# Patient Record
Sex: MALE | Race: WHITE | NOT HISPANIC OR LATINO
[De-identification: names, ages, dates, MRNs, and addresses within clinical notes are randomized per-mention and may not be internally consistent; named-entity substitution may affect disease eponyms.]

---

## 2017-01-09 ENCOUNTER — MEDICATION RENEWAL (OUTPATIENT)
Age: 77
End: 2017-01-09

## 2017-03-23 ENCOUNTER — APPOINTMENT (OUTPATIENT)
Dept: UROLOGY | Facility: CLINIC | Age: 77
End: 2017-03-23
Payer: MEDICARE

## 2017-03-23 PROCEDURE — 99213 OFFICE O/P EST LOW 20 MIN: CPT

## 2017-04-04 ENCOUNTER — NON-APPOINTMENT (OUTPATIENT)
Age: 77
End: 2017-04-04

## 2017-04-04 ENCOUNTER — APPOINTMENT (OUTPATIENT)
Dept: ELECTROPHYSIOLOGY | Facility: CLINIC | Age: 77
End: 2017-04-04

## 2017-04-04 VITALS
BODY MASS INDEX: 22.66 KG/M2 | HEART RATE: 63 BPM | WEIGHT: 153 LBS | HEIGHT: 69 IN | SYSTOLIC BLOOD PRESSURE: 147 MMHG | OXYGEN SATURATION: 98 % | DIASTOLIC BLOOD PRESSURE: 75 MMHG

## 2017-04-04 VITALS — DIASTOLIC BLOOD PRESSURE: 65 MMHG | SYSTOLIC BLOOD PRESSURE: 125 MMHG

## 2017-04-04 DIAGNOSIS — R00.2 PALPITATIONS: ICD-10-CM

## 2017-10-12 ENCOUNTER — APPOINTMENT (OUTPATIENT)
Dept: UROLOGY | Facility: CLINIC | Age: 77
End: 2017-10-12
Payer: MEDICARE

## 2017-10-12 VITALS
TEMPERATURE: 98.2 F | DIASTOLIC BLOOD PRESSURE: 74 MMHG | HEIGHT: 69 IN | SYSTOLIC BLOOD PRESSURE: 128 MMHG | HEART RATE: 74 BPM | RESPIRATION RATE: 16 BRPM | BODY MASS INDEX: 22.66 KG/M2 | WEIGHT: 153 LBS | OXYGEN SATURATION: 98 %

## 2017-10-12 PROCEDURE — 99213 OFFICE O/P EST LOW 20 MIN: CPT

## 2017-11-02 ENCOUNTER — APPOINTMENT (OUTPATIENT)
Dept: ELECTROPHYSIOLOGY | Facility: CLINIC | Age: 77
End: 2017-11-02
Payer: MEDICARE

## 2017-11-02 ENCOUNTER — NON-APPOINTMENT (OUTPATIENT)
Age: 77
End: 2017-11-02

## 2017-11-02 ENCOUNTER — MEDICATION RENEWAL (OUTPATIENT)
Age: 77
End: 2017-11-02

## 2017-11-02 VITALS — DIASTOLIC BLOOD PRESSURE: 66 MMHG | OXYGEN SATURATION: 96 % | SYSTOLIC BLOOD PRESSURE: 110 MMHG

## 2017-11-02 PROCEDURE — 99214 OFFICE O/P EST MOD 30 MIN: CPT

## 2017-11-02 PROCEDURE — 93000 ELECTROCARDIOGRAM COMPLETE: CPT

## 2018-05-03 ENCOUNTER — APPOINTMENT (OUTPATIENT)
Dept: ELECTROPHYSIOLOGY | Facility: CLINIC | Age: 78
End: 2018-05-03
Payer: MEDICARE

## 2018-05-03 ENCOUNTER — NON-APPOINTMENT (OUTPATIENT)
Age: 78
End: 2018-05-03

## 2018-05-03 VITALS
BODY MASS INDEX: 23.48 KG/M2 | WEIGHT: 159 LBS | OXYGEN SATURATION: 98 % | SYSTOLIC BLOOD PRESSURE: 159 MMHG | DIASTOLIC BLOOD PRESSURE: 75 MMHG | HEART RATE: 58 BPM

## 2018-05-03 VITALS — SYSTOLIC BLOOD PRESSURE: 120 MMHG | DIASTOLIC BLOOD PRESSURE: 60 MMHG

## 2018-05-03 PROCEDURE — 93000 ELECTROCARDIOGRAM COMPLETE: CPT

## 2018-05-03 PROCEDURE — 99215 OFFICE O/P EST HI 40 MIN: CPT

## 2018-05-03 RX ORDER — LEVOCETIRIZINE DIHYDROCHLORIDE 5 MG/1
5 TABLET ORAL DAILY
Refills: 0 | Status: DISCONTINUED | COMMUNITY
End: 2018-05-03

## 2018-06-14 ENCOUNTER — APPOINTMENT (OUTPATIENT)
Dept: UROLOGY | Facility: CLINIC | Age: 78
End: 2018-06-14
Payer: MEDICARE

## 2018-06-14 VITALS — OXYGEN SATURATION: 96 % | SYSTOLIC BLOOD PRESSURE: 110 MMHG | HEART RATE: 66 BPM | DIASTOLIC BLOOD PRESSURE: 60 MMHG

## 2018-06-14 PROCEDURE — 99213 OFFICE O/P EST LOW 20 MIN: CPT

## 2018-07-05 ENCOUNTER — APPOINTMENT (OUTPATIENT)
Dept: NEUROLOGY | Facility: CLINIC | Age: 78
End: 2018-07-05
Payer: MEDICARE

## 2018-07-05 VITALS
OXYGEN SATURATION: 96 % | HEIGHT: 69 IN | SYSTOLIC BLOOD PRESSURE: 120 MMHG | BODY MASS INDEX: 23.25 KG/M2 | TEMPERATURE: 98.6 F | RESPIRATION RATE: 16 BRPM | WEIGHT: 157 LBS | HEART RATE: 60 BPM | DIASTOLIC BLOOD PRESSURE: 70 MMHG

## 2018-07-05 PROCEDURE — 99204 OFFICE O/P NEW MOD 45 MIN: CPT

## 2018-10-23 ENCOUNTER — NON-APPOINTMENT (OUTPATIENT)
Age: 78
End: 2018-10-23

## 2018-10-23 ENCOUNTER — APPOINTMENT (OUTPATIENT)
Dept: ELECTROPHYSIOLOGY | Facility: CLINIC | Age: 78
End: 2018-10-23
Payer: MEDICARE

## 2018-10-23 VITALS — SYSTOLIC BLOOD PRESSURE: 115 MMHG | DIASTOLIC BLOOD PRESSURE: 65 MMHG

## 2018-10-23 VITALS
WEIGHT: 157 LBS | BODY MASS INDEX: 23.18 KG/M2 | OXYGEN SATURATION: 98 % | SYSTOLIC BLOOD PRESSURE: 154 MMHG | HEART RATE: 60 BPM | DIASTOLIC BLOOD PRESSURE: 81 MMHG

## 2018-10-23 PROCEDURE — 93000 ELECTROCARDIOGRAM COMPLETE: CPT

## 2018-10-23 PROCEDURE — 99213 OFFICE O/P EST LOW 20 MIN: CPT

## 2018-10-30 ENCOUNTER — MEDICATION RENEWAL (OUTPATIENT)
Age: 78
End: 2018-10-30

## 2019-01-08 ENCOUNTER — APPOINTMENT (OUTPATIENT)
Dept: NEUROLOGY | Facility: CLINIC | Age: 79
End: 2019-01-08
Payer: MEDICARE

## 2019-01-08 VITALS
BODY MASS INDEX: 22.66 KG/M2 | SYSTOLIC BLOOD PRESSURE: 124 MMHG | HEART RATE: 59 BPM | HEIGHT: 69 IN | DIASTOLIC BLOOD PRESSURE: 68 MMHG | WEIGHT: 153 LBS

## 2019-01-08 PROCEDURE — 99213 OFFICE O/P EST LOW 20 MIN: CPT

## 2019-03-07 ENCOUNTER — APPOINTMENT (OUTPATIENT)
Dept: UROLOGY | Facility: CLINIC | Age: 79
End: 2019-03-07
Payer: MEDICARE

## 2019-03-07 VITALS
DIASTOLIC BLOOD PRESSURE: 60 MMHG | HEART RATE: 84 BPM | HEIGHT: 69 IN | SYSTOLIC BLOOD PRESSURE: 110 MMHG | WEIGHT: 149 LBS | BODY MASS INDEX: 22.07 KG/M2 | OXYGEN SATURATION: 98 %

## 2019-03-07 PROCEDURE — 99213 OFFICE O/P EST LOW 20 MIN: CPT

## 2019-03-07 RX ORDER — UMECLIDINIUM BROMIDE AND VILANTEROL TRIFENATATE 62.5; 25 UG/1; UG/1
62.5-25 POWDER RESPIRATORY (INHALATION)
Refills: 0 | Status: ACTIVE | COMMUNITY

## 2019-03-07 NOTE — HISTORY OF PRESENT ILLNESS
[FreeTextEntry1] : He is a 78-year-old man who seen today in follow-up for elevated PSA and complex renal cyst. Since last visit, he has developed Parkinson's disease. Urinary flow is slow. Also flow is interrupted. Nocturia is 2-3 times. Residual urine again today was minimal. He is on tamsulosin 0.8 mg and finasteride. In June 2018, PSA level was 2.03. He has no flank pain. He has chronic renal insufficiency with creatinine level around 2.5. CT scan of abdomen and pelvis without contrast in April 2018 showed a 10 cm left renal cyst unchanged from before.\par Previous notes: Renal ultrasound in January 2016 showed complex cyst in the left kidney 7.9 cm and other bilateral renal cysts including 2 complex cysts in the left kidney up to 1.8 cm. Non-contrast CT scan in May 2016 showed punctate bilateral renal stones, renal cysts of varying sizes largest on the left side.\par He underwent cystoscopy in 11/2013 which showed a large prostate and bleeding from the surface of the prostate. He had 2 prostate biopsies in the past. Previous CT scans including a CT scan with contrast in 2010 indicated that he had a large parapelvic renal cyst.

## 2019-03-07 NOTE — ASSESSMENT
[FreeTextEntry1] : Urinary symptoms are not changed significantly. He is maximized on Flomax and Proscar. His urinary symptoms worsen, we could consider surgical treatment options. He will think about it. He can followup in 6-9 months.\par \par Kwame Brady MD, FACS\par [facility]\par  of Urology\par \par [address], [address]\[place] [address]\par \par [address], [address]\[place] [address]\par \par Tel: [phone]\par Fax: [phone]

## 2019-03-07 NOTE — LETTER BODY
[Dear  ___] : Dear  [unfilled], [Attached please find my note.] : Attached please find my note. [Thank you very much for allowing me to participate in the care of this patient. If you have any questions, please do not hesitate to contact me] : Thank you very much for allowing me to participate in the care of this patient. If you have any questions, please do not hesitate to contact me. [FreeTextEntry1] : ACP\par [address] \[place], [place], [address]  \par [phone] \par

## 2019-03-07 NOTE — PHYSICAL EXAM
[General Appearance - Well Developed] : well developed [General Appearance - Well Nourished] : well nourished [Normal Appearance] : normal appearance [Well Groomed] : well groomed [General Appearance - In No Acute Distress] : no acute distress [Abdomen Soft] : soft [Abdomen Tenderness] : non-tender [Costovertebral Angle Tenderness] : no ~M costovertebral angle tenderness [Urethral Meatus] : meatus normal [Penis Abnormality] : normal circumcised penis [Urinary Bladder Findings] : the bladder was normal on palpation [Scrotum] : the scrotum was normal [Epididymis] : the epididymides were normal [Testes Tenderness] : no tenderness of the testes [Testes Mass (___cm)] : there were no testicular masses [Prostate Tenderness] : the prostate was not tender [No Prostate Nodules] : no prostate nodules [Prostate Enlarged] : was enlarged [FreeTextEntry1] : ELVER done 3/2019 [] : no respiratory distress [Respiration, Rhythm And Depth] : normal respiratory rhythm and effort [Exaggerated Use Of Accessory Muscles For Inspiration] : no accessory muscle use [Oriented To Time, Place, And Person] : oriented to person, place, and time [Affect] : the affect was normal [Mood] : the mood was normal [Not Anxious] : not anxious

## 2019-04-18 ENCOUNTER — NON-APPOINTMENT (OUTPATIENT)
Age: 79
End: 2019-04-18

## 2019-04-18 ENCOUNTER — APPOINTMENT (OUTPATIENT)
Dept: ELECTROPHYSIOLOGY | Facility: CLINIC | Age: 79
End: 2019-04-18
Payer: MEDICARE

## 2019-04-18 VITALS
SYSTOLIC BLOOD PRESSURE: 132 MMHG | BODY MASS INDEX: 22.07 KG/M2 | DIASTOLIC BLOOD PRESSURE: 78 MMHG | HEART RATE: 64 BPM | OXYGEN SATURATION: 98 % | HEIGHT: 69 IN | WEIGHT: 149 LBS

## 2019-04-18 PROCEDURE — 93000 ELECTROCARDIOGRAM COMPLETE: CPT

## 2019-04-18 PROCEDURE — 99213 OFFICE O/P EST LOW 20 MIN: CPT

## 2019-04-18 RX ORDER — UMECLIDINIUM BROMIDE AND VILANTEROL TRIFENATATE 62.5; 25 UG/1; UG/1
62.5-25 POWDER RESPIRATORY (INHALATION)
Refills: 0 | Status: DISCONTINUED | COMMUNITY
End: 2019-04-18

## 2019-04-18 NOTE — REASON FOR VISIT
[Follow-Up - Clinic] : a clinic follow-up of [Palpitations] : palpitations [FreeTextEntry1] : Status post caval tricuspid isthmus for typical flutter 8/13/2012.

## 2019-04-18 NOTE — HISTORY OF PRESENT ILLNESS
[FreeTextEntry1] : Rare twinges in the left upper chest.  Very transient.  No associated lightheadedness/dizziness.  No identifiable trigger. He is tolerating his warfarin with no bleeding or TE complication.  He has increased his activity, walking more now that the weather has gotten better.

## 2019-04-18 NOTE — DISCUSSION/SUMMARY
[FreeTextEntry1] : Low burden of symptoms. Stable from arrhythmia standpoint.  c/w metoprolol and AC.  Follow up in 6 months.

## 2019-04-18 NOTE — PHYSICAL EXAM
[General Appearance - Well Developed] : well developed [General Appearance - Well Nourished] : well nourished [Normal Conjunctiva] : the conjunctiva exhibited no abnormalities [Normal Oral Mucosa] : normal oral mucosa [] : no respiratory distress [Normal Oropharynx] : normal oropharynx [Normal Jugular Venous V Waves Present] : normal jugular venous V waves present [Respiration, Rhythm And Depth] : normal respiratory rhythm and effort [Auscultation Breath Sounds / Voice Sounds] : lungs were clear to auscultation bilaterally [Heart Rate And Rhythm] : heart rate and rhythm were normal [Heart Sounds] : normal S1 and S2 [Arterial Pulses Normal] : the arterial pulses were normal [Edema] : no peripheral edema present [Murmurs] : no murmurs present [Bowel Sounds] : normal bowel sounds [Abdomen Soft] : soft [Abnormal Walk] : normal gait [Nail Clubbing] : no clubbing of the fingernails [Skin Color & Pigmentation] : normal skin color and pigmentation [Cyanosis, Localized] : no localized cyanosis [Skin Turgor] : normal skin turgor [Oriented To Time, Place, And Person] : oriented to person, place, and time [Impaired Insight] : insight and judgment were intact

## 2019-04-18 NOTE — REVIEW OF SYSTEMS
[Eyeglasses] : currently wearing eyeglasses [see HPI] : see HPI [Negative] : Heme/Lymph [FreeTextEntry2] : Moes surgery of the left ear (with graft)

## 2019-05-25 ENCOUNTER — INPATIENT (INPATIENT)
Facility: HOSPITAL | Age: 79
LOS: 2 days | Discharge: HOME | DRG: 309 | End: 2019-05-28
Attending: STUDENT IN AN ORGANIZED HEALTH CARE EDUCATION/TRAINING PROGRAM | Admitting: STUDENT IN AN ORGANIZED HEALTH CARE EDUCATION/TRAINING PROGRAM
Payer: COMMERCIAL

## 2019-05-25 VITALS
SYSTOLIC BLOOD PRESSURE: 118 MMHG | OXYGEN SATURATION: 94 % | DIASTOLIC BLOOD PRESSURE: 82 MMHG | RESPIRATION RATE: 16 BRPM | WEIGHT: 151.02 LBS | HEART RATE: 135 BPM | HEIGHT: 69 IN | TEMPERATURE: 98 F

## 2019-05-25 DIAGNOSIS — Z98.89 OTHER SPECIFIED POSTPROCEDURAL STATES: Chronic | ICD-10-CM

## 2019-05-25 DIAGNOSIS — N40.0 BENIGN PROSTATIC HYPERPLASIA WITHOUT LOWER URINARY TRACT SYMPTOMS: ICD-10-CM

## 2019-05-25 DIAGNOSIS — N18.9 CHRONIC KIDNEY DISEASE, UNSPECIFIED: ICD-10-CM

## 2019-05-25 DIAGNOSIS — R00.0 TACHYCARDIA, UNSPECIFIED: ICD-10-CM

## 2019-05-25 DIAGNOSIS — N17.9 ACUTE KIDNEY FAILURE, UNSPECIFIED: ICD-10-CM

## 2019-05-25 DIAGNOSIS — Z29.9 ENCOUNTER FOR PROPHYLACTIC MEASURES, UNSPECIFIED: ICD-10-CM

## 2019-05-25 DIAGNOSIS — E11.9 TYPE 2 DIABETES MELLITUS WITHOUT COMPLICATIONS: ICD-10-CM

## 2019-05-25 DIAGNOSIS — J44.9 CHRONIC OBSTRUCTIVE PULMONARY DISEASE, UNSPECIFIED: ICD-10-CM

## 2019-05-25 DIAGNOSIS — D72.829 ELEVATED WHITE BLOOD CELL COUNT, UNSPECIFIED: ICD-10-CM

## 2019-05-25 DIAGNOSIS — I48.92 UNSPECIFIED ATRIAL FLUTTER: ICD-10-CM

## 2019-05-25 LAB
ALBUMIN SERPL ELPH-MCNC: 4 G/DL — SIGNIFICANT CHANGE UP (ref 3.3–5)
ALP SERPL-CCNC: 68 U/L — SIGNIFICANT CHANGE UP (ref 40–120)
ALT FLD-CCNC: 22 U/L — SIGNIFICANT CHANGE UP (ref 10–45)
ANION GAP SERPL CALC-SCNC: 15 MMOL/L — SIGNIFICANT CHANGE UP (ref 5–17)
APPEARANCE UR: CLEAR — SIGNIFICANT CHANGE UP
APTT BLD: 39.3 SEC — HIGH (ref 27.5–36.3)
AST SERPL-CCNC: 20 U/L — SIGNIFICANT CHANGE UP (ref 10–40)
BACTERIA # UR AUTO: NEGATIVE — SIGNIFICANT CHANGE UP
BASOPHILS # BLD AUTO: 0.1 K/UL — SIGNIFICANT CHANGE UP (ref 0–0.2)
BASOPHILS NFR BLD AUTO: 0.4 % — SIGNIFICANT CHANGE UP (ref 0–2)
BILIRUB SERPL-MCNC: 0.5 MG/DL — SIGNIFICANT CHANGE UP (ref 0.2–1.2)
BILIRUB UR-MCNC: NEGATIVE — SIGNIFICANT CHANGE UP
BUN SERPL-MCNC: 34 MG/DL — HIGH (ref 7–23)
CALCIUM SERPL-MCNC: 9.6 MG/DL — SIGNIFICANT CHANGE UP (ref 8.4–10.5)
CHLORIDE SERPL-SCNC: 98 MMOL/L — SIGNIFICANT CHANGE UP (ref 96–108)
CO2 SERPL-SCNC: 22 MMOL/L — SIGNIFICANT CHANGE UP (ref 22–31)
COLOR SPEC: YELLOW — SIGNIFICANT CHANGE UP
CREAT SERPL-MCNC: 2.6 MG/DL — HIGH (ref 0.5–1.3)
DIFF PNL FLD: NEGATIVE — SIGNIFICANT CHANGE UP
EOSINOPHIL # BLD AUTO: 0.1 K/UL — SIGNIFICANT CHANGE UP (ref 0–0.5)
EOSINOPHIL NFR BLD AUTO: 1.2 % — SIGNIFICANT CHANGE UP (ref 0–6)
EPI CELLS # UR: 1 /HPF — SIGNIFICANT CHANGE UP
GLUCOSE BLDC GLUCOMTR-MCNC: 155 MG/DL — HIGH (ref 70–99)
GLUCOSE SERPL-MCNC: 300 MG/DL — HIGH (ref 70–99)
GLUCOSE UR QL: ABNORMAL
HCT VFR BLD CALC: 44.4 % — SIGNIFICANT CHANGE UP (ref 39–50)
HGB BLD-MCNC: 15 G/DL — SIGNIFICANT CHANGE UP (ref 13–17)
HYALINE CASTS # UR AUTO: 2 /LPF — SIGNIFICANT CHANGE UP (ref 0–2)
INR BLD: 2.77 RATIO — HIGH (ref 0.88–1.16)
KETONES UR-MCNC: NEGATIVE — SIGNIFICANT CHANGE UP
LEUKOCYTE ESTERASE UR-ACNC: NEGATIVE — SIGNIFICANT CHANGE UP
LYMPHOCYTES # BLD AUTO: 1.8 K/UL — SIGNIFICANT CHANGE UP (ref 1–3.3)
LYMPHOCYTES # BLD AUTO: 13.9 % — SIGNIFICANT CHANGE UP (ref 13–44)
MCHC RBC-ENTMCNC: 33.9 GM/DL — SIGNIFICANT CHANGE UP (ref 32–36)
MCHC RBC-ENTMCNC: 34.4 PG — HIGH (ref 27–34)
MCV RBC AUTO: 101 FL — HIGH (ref 80–100)
MONOCYTES # BLD AUTO: 1.1 K/UL — HIGH (ref 0–0.9)
MONOCYTES NFR BLD AUTO: 8.4 % — SIGNIFICANT CHANGE UP (ref 2–14)
NEUTROPHILS # BLD AUTO: 9.6 K/UL — HIGH (ref 1.8–7.4)
NEUTROPHILS NFR BLD AUTO: 76.1 % — SIGNIFICANT CHANGE UP (ref 43–77)
NITRITE UR-MCNC: NEGATIVE — SIGNIFICANT CHANGE UP
PH UR: 6 — SIGNIFICANT CHANGE UP (ref 5–8)
PLATELET # BLD AUTO: 139 K/UL — LOW (ref 150–400)
POTASSIUM SERPL-MCNC: 4.7 MMOL/L — SIGNIFICANT CHANGE UP (ref 3.5–5.3)
POTASSIUM SERPL-SCNC: 4.7 MMOL/L — SIGNIFICANT CHANGE UP (ref 3.5–5.3)
PROT SERPL-MCNC: 7.4 G/DL — SIGNIFICANT CHANGE UP (ref 6–8.3)
PROT UR-MCNC: 100 — SIGNIFICANT CHANGE UP
PROTHROM AB SERPL-ACNC: 32.6 SEC — HIGH (ref 10–12.9)
RBC # BLD: 4.37 M/UL — SIGNIFICANT CHANGE UP (ref 4.2–5.8)
RBC # FLD: 12 % — SIGNIFICANT CHANGE UP (ref 10.3–14.5)
RBC CASTS # UR COMP ASSIST: 3 /HPF — SIGNIFICANT CHANGE UP (ref 0–4)
SODIUM SERPL-SCNC: 135 MMOL/L — SIGNIFICANT CHANGE UP (ref 135–145)
SP GR SPEC: 1.02 — SIGNIFICANT CHANGE UP (ref 1.01–1.02)
TROPONIN T, HIGH SENSITIVITY RESULT: 30 NG/L — SIGNIFICANT CHANGE UP (ref 0–51)
UROBILINOGEN FLD QL: NEGATIVE — SIGNIFICANT CHANGE UP
WBC # BLD: 12.7 K/UL — HIGH (ref 3.8–10.5)
WBC # FLD AUTO: 12.7 K/UL — HIGH (ref 3.8–10.5)
WBC UR QL: 6 /HPF — HIGH (ref 0–5)

## 2019-05-25 PROCEDURE — 99222 1ST HOSP IP/OBS MODERATE 55: CPT | Mod: GC

## 2019-05-25 PROCEDURE — 70450 CT HEAD/BRAIN W/O DYE: CPT | Mod: 26

## 2019-05-25 PROCEDURE — 93308 TTE F-UP OR LMTD: CPT | Mod: 26

## 2019-05-25 PROCEDURE — 71046 X-RAY EXAM CHEST 2 VIEWS: CPT | Mod: 26

## 2019-05-25 PROCEDURE — 72128 CT CHEST SPINE W/O DYE: CPT | Mod: 26

## 2019-05-25 PROCEDURE — 99285 EMERGENCY DEPT VISIT HI MDM: CPT

## 2019-05-25 PROCEDURE — 99223 1ST HOSP IP/OBS HIGH 75: CPT

## 2019-05-25 RX ORDER — METOPROLOL TARTRATE 50 MG
25 TABLET ORAL ONCE
Refills: 0 | Status: COMPLETED | OUTPATIENT
Start: 2019-05-25 | End: 2019-05-25

## 2019-05-25 RX ORDER — WARFARIN SODIUM 2.5 MG/1
2 TABLET ORAL ONCE
Refills: 0 | Status: COMPLETED | OUTPATIENT
Start: 2019-05-25 | End: 2019-05-25

## 2019-05-25 RX ORDER — DEXTROSE 50 % IN WATER 50 %
25 SYRINGE (ML) INTRAVENOUS ONCE
Refills: 0 | Status: DISCONTINUED | OUTPATIENT
Start: 2019-05-25 | End: 2019-05-28

## 2019-05-25 RX ORDER — SODIUM CHLORIDE 9 MG/ML
1000 INJECTION, SOLUTION INTRAVENOUS ONCE
Refills: 0 | Status: COMPLETED | OUTPATIENT
Start: 2019-05-25 | End: 2019-05-25

## 2019-05-25 RX ORDER — MONTELUKAST 4 MG/1
10 TABLET, CHEWABLE ORAL DAILY
Refills: 0 | Status: DISCONTINUED | OUTPATIENT
Start: 2019-05-25 | End: 2019-05-28

## 2019-05-25 RX ORDER — TAMSULOSIN HYDROCHLORIDE 0.4 MG/1
0.8 CAPSULE ORAL AT BEDTIME
Refills: 0 | Status: DISCONTINUED | OUTPATIENT
Start: 2019-05-25 | End: 2019-05-28

## 2019-05-25 RX ORDER — GLUCAGON INJECTION, SOLUTION 0.5 MG/.1ML
1 INJECTION, SOLUTION SUBCUTANEOUS ONCE
Refills: 0 | Status: DISCONTINUED | OUTPATIENT
Start: 2019-05-25 | End: 2019-05-28

## 2019-05-25 RX ORDER — ACETAMINOPHEN 500 MG
650 TABLET ORAL EVERY 6 HOURS
Refills: 0 | Status: DISCONTINUED | OUTPATIENT
Start: 2019-05-25 | End: 2019-05-28

## 2019-05-25 RX ORDER — INSULIN LISPRO 100/ML
VIAL (ML) SUBCUTANEOUS
Refills: 0 | Status: DISCONTINUED | OUTPATIENT
Start: 2019-05-25 | End: 2019-05-28

## 2019-05-25 RX ORDER — SODIUM CHLORIDE 9 MG/ML
1000 INJECTION INTRAMUSCULAR; INTRAVENOUS; SUBCUTANEOUS ONCE
Refills: 0 | Status: COMPLETED | OUTPATIENT
Start: 2019-05-25 | End: 2019-05-25

## 2019-05-25 RX ORDER — IPRATROPIUM/ALBUTEROL SULFATE 18-103MCG
3 AEROSOL WITH ADAPTER (GRAM) INHALATION EVERY 6 HOURS
Refills: 0 | Status: DISCONTINUED | OUTPATIENT
Start: 2019-05-25 | End: 2019-05-28

## 2019-05-25 RX ORDER — BUDESONIDE AND FORMOTEROL FUMARATE DIHYDRATE 160; 4.5 UG/1; UG/1
2 AEROSOL RESPIRATORY (INHALATION)
Refills: 0 | Status: DISCONTINUED | OUTPATIENT
Start: 2019-05-25 | End: 2019-05-28

## 2019-05-25 RX ORDER — METOPROLOL TARTRATE 50 MG
25 TABLET ORAL EVERY 12 HOURS
Refills: 0 | Status: DISCONTINUED | OUTPATIENT
Start: 2019-05-25 | End: 2019-05-26

## 2019-05-25 RX ORDER — SODIUM CHLORIDE 9 MG/ML
1000 INJECTION, SOLUTION INTRAVENOUS
Refills: 0 | Status: DISCONTINUED | OUTPATIENT
Start: 2019-05-25 | End: 2019-05-28

## 2019-05-25 RX ORDER — DIPHENHYDRAMINE HCL 50 MG
25 CAPSULE ORAL AT BEDTIME
Refills: 0 | Status: DISCONTINUED | OUTPATIENT
Start: 2019-05-25 | End: 2019-05-28

## 2019-05-25 RX ORDER — ACETAMINOPHEN 500 MG
975 TABLET ORAL ONCE
Refills: 0 | Status: COMPLETED | OUTPATIENT
Start: 2019-05-25 | End: 2019-05-25

## 2019-05-25 RX ORDER — METOPROLOL TARTRATE 50 MG
5 TABLET ORAL ONCE
Refills: 0 | Status: COMPLETED | OUTPATIENT
Start: 2019-05-25 | End: 2019-05-25

## 2019-05-25 RX ORDER — INSULIN LISPRO 100/ML
VIAL (ML) SUBCUTANEOUS AT BEDTIME
Refills: 0 | Status: DISCONTINUED | OUTPATIENT
Start: 2019-05-25 | End: 2019-05-28

## 2019-05-25 RX ORDER — DILTIAZEM HCL 120 MG
10 CAPSULE, EXT RELEASE 24 HR ORAL ONCE
Refills: 0 | Status: COMPLETED | OUTPATIENT
Start: 2019-05-25 | End: 2019-05-25

## 2019-05-25 RX ORDER — LIDOCAINE 4 G/100G
1 CREAM TOPICAL ONCE
Refills: 0 | Status: COMPLETED | OUTPATIENT
Start: 2019-05-25 | End: 2019-05-25

## 2019-05-25 RX ORDER — DEXTROSE 50 % IN WATER 50 %
15 SYRINGE (ML) INTRAVENOUS ONCE
Refills: 0 | Status: DISCONTINUED | OUTPATIENT
Start: 2019-05-25 | End: 2019-05-28

## 2019-05-25 RX ORDER — DEXTROSE 50 % IN WATER 50 %
12.5 SYRINGE (ML) INTRAVENOUS ONCE
Refills: 0 | Status: DISCONTINUED | OUTPATIENT
Start: 2019-05-25 | End: 2019-05-28

## 2019-05-25 RX ORDER — FINASTERIDE 5 MG/1
5 TABLET, FILM COATED ORAL DAILY
Refills: 0 | Status: DISCONTINUED | OUTPATIENT
Start: 2019-05-25 | End: 2019-05-28

## 2019-05-25 RX ORDER — LANOLIN ALCOHOL/MO/W.PET/CERES
5 CREAM (GRAM) TOPICAL AT BEDTIME
Refills: 0 | Status: DISCONTINUED | OUTPATIENT
Start: 2019-05-25 | End: 2019-05-28

## 2019-05-25 RX ORDER — METOPROLOL TARTRATE 50 MG
5 TABLET ORAL ONCE
Refills: 0 | Status: DISCONTINUED | OUTPATIENT
Start: 2019-05-25 | End: 2019-05-25

## 2019-05-25 RX ADMIN — SODIUM CHLORIDE 1000 MILLILITER(S): 9 INJECTION INTRAMUSCULAR; INTRAVENOUS; SUBCUTANEOUS at 15:38

## 2019-05-25 RX ADMIN — Medication 25 MILLIGRAM(S): at 23:54

## 2019-05-25 RX ADMIN — Medication 975 MILLIGRAM(S): at 13:52

## 2019-05-25 RX ADMIN — Medication 5 MILLIGRAM(S): at 18:25

## 2019-05-25 RX ADMIN — TAMSULOSIN HYDROCHLORIDE 0.8 MILLIGRAM(S): 0.4 CAPSULE ORAL at 23:50

## 2019-05-25 RX ADMIN — Medication 5 MILLIGRAM(S): at 23:50

## 2019-05-25 RX ADMIN — SODIUM CHLORIDE 1000 MILLILITER(S): 9 INJECTION INTRAMUSCULAR; INTRAVENOUS; SUBCUTANEOUS at 13:26

## 2019-05-25 RX ADMIN — Medication 10 MILLIGRAM(S): at 15:48

## 2019-05-25 RX ADMIN — Medication 25 MILLIGRAM(S): at 19:28

## 2019-05-25 RX ADMIN — Medication 5 MILLIGRAM(S): at 22:31

## 2019-05-25 RX ADMIN — Medication 5 MILLIGRAM(S): at 22:14

## 2019-05-25 RX ADMIN — MONTELUKAST 10 MILLIGRAM(S): 4 TABLET, CHEWABLE ORAL at 23:50

## 2019-05-25 RX ADMIN — Medication 1: at 23:56

## 2019-05-25 RX ADMIN — LIDOCAINE 1 PATCH: 4 CREAM TOPICAL at 13:25

## 2019-05-25 RX ADMIN — FINASTERIDE 5 MILLIGRAM(S): 5 TABLET, FILM COATED ORAL at 23:50

## 2019-05-25 RX ADMIN — SODIUM CHLORIDE 1000 MILLILITER(S): 9 INJECTION, SOLUTION INTRAVENOUS at 17:10

## 2019-05-25 RX ADMIN — Medication 975 MILLIGRAM(S): at 13:26

## 2019-05-25 RX ADMIN — BUDESONIDE AND FORMOTEROL FUMARATE DIHYDRATE 2 PUFF(S): 160; 4.5 AEROSOL RESPIRATORY (INHALATION) at 23:51

## 2019-05-25 RX ADMIN — Medication 25 MILLIGRAM(S): at 23:50

## 2019-05-25 RX ADMIN — WARFARIN SODIUM 2 MILLIGRAM(S): 2.5 TABLET ORAL at 23:54

## 2019-05-25 NOTE — ED ADULT NURSE NOTE - OBJECTIVE STATEMENT
78 y m came to the ed c/o fall on tuesday and thursday. patient states on tuesday he fell backwards off a step stool. states he did not hit his head but is unsure if he had lost consciousness as he was disoriented for a "couple seconds" after the fall. since then patient is c/o lower back and upper back pain. patient has ecchymosis on his upper back. patient then again fell on thursday when getting out of bed. patient states he was sleeping and woke up on the floor although wife states patient was talking prior to falling on thursday. patient is a/ox3. states going to pmd who did out patient xray which were negative as per patient and family. abdomen is soft and nontender. no spinal tenderness. patient is able to ambulate unassisted. equal strength and sensation in all extremities. skin is warm and dry.

## 2019-05-25 NOTE — H&P ADULT - PROBLEM SELECTOR PLAN 5
-will do symbicort and duonebs q6 PRN  -c/w home montelukast --description of falls sounds mechanical, likely in the setting of Parkinsons.  --on telemetry for afib/flutter RVR. Will need TTE for aflutter work up per cardiology. Will hold off on further work up for syncope.

## 2019-05-25 NOTE — H&P ADULT - NSHPPHYSICALEXAM_GEN_ALL_CORE
PHYSICAL EXAM:   GEN: Age appropriate, resting comfortably in bed, no acute distress, non toxic appearing, speaking in complete sentences.   HEENT: Conjunctiva and sclera normal. Dried scab right cheek near mouth. No bruising or lacerations on head or neck. Area of redness posterior neck  PULM: Lungs CTAB, no wheezes, rales, rhonchi  CV: tachy, irregular, S1S2, no MRG  MSK: no stiffness or joint effusions  Abdominal: Soft, nontender to palpation, non-distended, +BS. Large midline abdominal surgical scar  Extremities: No edema or cyanosis  NEURO: AAOx4  Psych: normal affect, normal behavior  Skin: No rashes, lesions    T(C): 37 (05-25-19 @ 21:07), Max: 37.2 (05-25-19 @ 13:41)  HR: 130 (05-25-19 @ 21:07) (100 - 135)  BP: 126/86 (05-25-19 @ 21:07) (106/68 - 130/85)  RR: 16 (05-25-19 @ 21:07) (16 - 18)  SpO2: 96% (05-25-19 @ 21:07) (94% - 96%) PHYSICAL EXAM:   GEN: Age appropriate, resting comfortably in bed, no acute distress, non toxic appearing, speaking in complete sentences.   HEENT: Conjunctiva and sclera normal. Dried scab right cheek near mouth. No bruising or lacerations on head or neck. Area of redness posterior neck  PULM: Lungs CTAB, no wheezes, rales, rhonchi  CV: tachy 120s, normal S1S2, no MRG  MSK: no stiffness or joint effusions  Abdominal: Soft, nontender to palpation, non-distended, +BS. Large midline abdominal surgical scar. No suprapubic tenderness or distension  Extremities: No edema or cyanosis  NEURO: AAOx4  Psych: normal affect, normal behavior  Skin: No rashes, lesions    T(C): 37 (05-25-19 @ 21:07), Max: 37.2 (05-25-19 @ 13:41)  HR: 130 (05-25-19 @ 21:07) (100 - 135)  BP: 126/86 (05-25-19 @ 21:07) (106/68 - 130/85)  RR: 16 (05-25-19 @ 21:07) (16 - 18)  SpO2: 96% (05-25-19 @ 21:07) (94% - 96%) PHYSICAL EXAM:   GEN: Age appropriate, resting comfortably in bed, no acute distress, non toxic appearing, speaking in complete sentences.   HEENT: Conjunctiva and sclera normal. Dried scab right cheek near mouth. No bruising or lacerations on head or neck. Area of redness posterior neck  PULM: Lungs bilateral crackles  CV: tachy 120s, normal S1S2, no MRG  MSK: no stiffness or joint effusions  Abdominal: Soft, nontender to palpation, non-distended, +BS. Large midline abdominal surgical scar. No suprapubic tenderness or distension  Extremities: No edema or cyanosis  NEURO: AAOx4  Psych: normal affect, normal behavior  Skin: No rashes, lesions    T(C): 37 (05-25-19 @ 21:07), Max: 37.2 (05-25-19 @ 13:41)  HR: 130 (05-25-19 @ 21:07) (100 - 135)  BP: 126/86 (05-25-19 @ 21:07) (106/68 - 130/85)  RR: 16 (05-25-19 @ 21:07) (16 - 18)  SpO2: 96% (05-25-19 @ 21:07) (94% - 96%) T(C): 37 (05-25-19 @ 21:07), Max: 37.2 (05-25-19 @ 13:41)  HR: 130 (05-25-19 @ 21:07) (100 - 135)  BP: 126/86 (05-25-19 @ 21:07) (106/68 - 130/85)  RR: 16 (05-25-19 @ 21:07) (16 - 18)  SpO2: 96% (05-25-19 @ 21:07) (94% - 96%)    PHYSICAL EXAM:   GEN: Age appropriate, resting comfortably in bed, no acute distress, non toxic appearing, speaking in complete sentences.   HEENT: Conjunctiva and sclera normal. Dried scab right cheek near mouth. No bruising or lacerations on head or neck. Area of redness posterior neck  PULM: Lungs bilateral crackles  CV: tachycardic 120s, regulars, normal S1S2, no MRG  MSK: no stiffness or joint effusions  Abdominal: Soft, nontender to palpation, non-distended, +BS. Large midline abdominal surgical scar. No suprapubic tenderness or distension  Extremities: No edema or cyanosis  NEURO: AAOx4  Psych: normal affect, normal behavior  Skin: No rashes, lesions

## 2019-05-25 NOTE — CONSULT NOTE ADULT - SUBJECTIVE AND OBJECTIVE BOX
Date of Admission: 19    Patient is a 78y old  Male who presents with a chief complaint of back pain.    HISTORY OF PRESENT ILLNESS:   77 yo M with afib/aflutter s/p ablation  on coumadin, COPD, DM2, AAA s/p repair presented with recent falls and back pain. Pt notes about 1 week ago he fell off his step stool due to misstep, no CP, palp, or dizziness or LOC at the time. After that he fell from his bed at night but does not recall the circumstances of the event. Denies any recent CP, palp, dizziness or syncope. He notes he has not been eating or drinking well for the past several days due to back pain after the falls. He also did not take his medications this morning. On arrival, found to be in aflutter 2:1 with HR 130s with stable BP and asymptomatic.     Allergies    amoxicillin (Pruritus; Rash)  nitrofurantoin (Unknown)  penicillins (Pruritus; Rash)    Intolerances    clindamycin (Stomach Upset)  	    MEDICATIONS:  coumadin  metop 25mg BID    PAST MEDICAL & SURGICAL HISTORY:  Hyperlipidemia  Colon cancer  Diabetes mellitus: Type 2  Basal Cell Cancer: s/p surgery left ear  Renal Insufficiency  Renal Cyst: left renal  Aneurysm: Abdominal aortic aneurysm  BPH (Benign Prostatic Hyperplasia)  COPD (Chronic Obstructive Pulmonary Disease)  Cardiac Arrhythmia: atrial fibrillation  History of bone marrow biopsy  H/O colonoscopy with polypectomy:  -  for malignancy  S/P tonsillectomy  S/P aortic aneurysm repair:   Basal Cell Cancer: excision left ear  S/P Lumbar Discectomy: in       FAMILY HISTORY:  Family history of colon cancer    REVIEW OF SYSTEMS:    CONSTITUTIONAL: +weakness, no fevers or chills  EYES/ENT: No visual changes;  No dysphagia  RESPIRATORY: No cough, wheezing, hemoptysis; No shortness of breath  CARDIOVASCULAR: No chest pain or palpitations; No lower extremity edema  GASTROINTESTINAL: No abdominal or epigastric pain. No nausea, vomiting, or hematemesis  GENITOURINARY: No dysuria, frequency or hematuria  NEUROLOGICAL: No numbness or weakness, +back pain  SKIN: No itching, burning, rashes, or lesions   All other review of systems is negative unless indicated above.    PHYSICAL EXAM:  T(C): 37 (19 @ 21:07), Max: 37.2 (19 @ 13:41)  HR: 130 (05-25-19 @ 21:07) (100 - 135)  BP: 126/86 (19 @ 21:07) (106/68 - 130/85)  RR: 16 (19 @ 21:07) (16 - 18)  SpO2: 96% (19 @ 21:07) (94% - 96%)  Wt(kg): --  I&O's Summary      Appearance: Normal	  HEENT:   Normal oral mucosa, +abrasion near lip  Cardiovascular: Tachycardic, Normal S1 S2, No JVD, No murmurs, No edema  Respiratory: Lungs clear to auscultation	  Psychiatry: A & O x 3, Mood & affect appropriate  Gastrointestinal:  Soft, Non-tender, + BS	  Skin: No rashes, No ecchymoses, No cyanosis	  Neurologic: Non-focal  Extremities: Normal range of motion, No clubbing, cyanosis or edema        LABS:	 	    CBC Full  -  ( 25 May 2019 13:20 )  WBC Count : 12.7 K/uL  Hemoglobin : 15.0 g/dL  Hematocrit : 44.4 %  Platelet Count - Automated : 139 K/uL  Mean Cell Volume : 101.0 fl  Mean Cell Hemoglobin : 34.4 pg  Mean Cell Hemoglobin Concentration : 33.9 gm/dL  Auto Neutrophil # : 9.6 K/uL  Auto Lymphocyte # : 1.8 K/uL  Auto Monocyte # : 1.1 K/uL  Auto Eosinophil # : 0.1 K/uL  Auto Basophil # : 0.1 K/uL  Auto Neutrophil % : 76.1 %  Auto Lymphocyte % : 13.9 %  Auto Monocyte % : 8.4 %  Auto Eosinophil % : 1.2 %  Auto Basophil % : 0.4 %        135  |  98  |  34<H>  ----------------------------<  300<H>  4.7   |  22  |  2.60<H>    Ca    9.6      25 May 2019 13:20    TPro  7.4  /  Alb  4.0  /  TBili  0.5  /  DBili  x   /  AST  20  /  ALT  22  /  AlkPhos  68        CARDIAC MARKERS:  hs trop 30 -> 30      TELEMETRY: 	  2:1 aflutter, HR 130s  EC:1 aflutter, HR 130s    PREVIOUS DIAGNOSTIC TESTING:    Echo   CONCLUSIONS:  1. Mitral annular calcification, otherwise normal mitral  valve. Minimal mitral regurgitation.  2. Normal left ventricular internal dimensionsand wall  thicknesses.  3. Endocardium not well visualized; grossly normal left  ventricular systolic function.  4. Mild diastolic dysfunction (Stage I).  5. Normal right ventricular size and function.    	  ASSESSMENT/PLAN: 	  77 yo M with afib/aflutter s/p ablation  on coumadin, COPD, DM2, AAA s/p repair presented with recent falls and back pain, found to be in aflutter.    Atypical aflutter 2:1  - HR 130s, asymptomatic, HDS; likely in setting of back pain/dehydration, missed medication doses  - s/p 25mg PO metop (home dose), 10mg IV cardizem with minimal improvement in rate  - recommend hydration, s/p 1L, getting 2nd L now  - please give 5mg IV metop q5 min x3, give another 25mg PO metop tartrate  - increase metop tartrate dose as tolerated for HR goal <120  - continue coumadin      Jose R Renee MD  Cardiology Fellow   [phone]    All Cardiology service information can be found on [email or web address], password: [email or web address]. Date of Admission: 19    Patient is a 78y old  Male who presents with a chief complaint of back pain.    HISTORY OF PRESENT ILLNESS:   79 yo M with afib/aflutter s/p ablation  on coumadin, COPD, DM2, AAA s/p repair presented with recent falls and back pain. Pt notes about 1 week ago he fell off his step stool due to misstep, no CP, palp, or dizziness or LOC at the time. After that he fell from his bed at night but does not recall the circumstances of the event. Denies any recent CP, palp, dizziness or syncope. He notes he has not been eating or drinking well for the past several days due to back pain after the falls. He also did not take his medications this morning. On arrival, found to be in aflutter 2:1 with HR 130s with stable BP and asymptomatic.     Allergies    amoxicillin (Pruritus; Rash)  nitrofurantoin (Unknown)  penicillins (Pruritus; Rash)    Intolerances    clindamycin (Stomach Upset)  	    MEDICATIONS:  coumadin  metop 25mg BID    PAST MEDICAL & SURGICAL HISTORY:  Hyperlipidemia  Colon cancer  Diabetes mellitus: Type 2  Basal Cell Cancer: s/p surgery left ear  Renal Insufficiency  Renal Cyst: left renal  Aneurysm: Abdominal aortic aneurysm  BPH (Benign Prostatic Hyperplasia)  COPD (Chronic Obstructive Pulmonary Disease)  Cardiac Arrhythmia: atrial fibrillation  History of bone marrow biopsy  H/O colonoscopy with polypectomy:  -  for malignancy  S/P tonsillectomy  S/P aortic aneurysm repair:   Basal Cell Cancer: excision left ear  S/P Lumbar Discectomy: in       FAMILY HISTORY:  Family history of colon cancer    REVIEW OF SYSTEMS:    CONSTITUTIONAL: +weakness, no fevers or chills  EYES/ENT: No visual changes;  No dysphagia  RESPIRATORY: No cough, wheezing, hemoptysis; No shortness of breath  CARDIOVASCULAR: No chest pain or palpitations; No lower extremity edema  GASTROINTESTINAL: No abdominal or epigastric pain. No nausea, vomiting, or hematemesis  GENITOURINARY: No dysuria, frequency or hematuria  NEUROLOGICAL: No numbness or weakness, +back pain  SKIN: No itching, burning, rashes, or lesions   All other review of systems is negative unless indicated above.    PHYSICAL EXAM:  T(C): 37 (19 @ 21:07), Max: 37.2 (19 @ 13:41)  HR: 130 (05-25-19 @ 21:07) (100 - 135)  BP: 126/86 (19 @ 21:07) (106/68 - 130/85)  RR: 16 (19 @ 21:07) (16 - 18)  SpO2: 96% (19 @ 21:07) (94% - 96%)  Wt(kg): --  I&O's Summary      Appearance: Normal	  HEENT:   Normal oral mucosa, +abrasion near lip  Cardiovascular: Tachycardic, Normal S1 S2, No JVD, No murmurs, No edema  Respiratory: Lungs clear to auscultation	  Psychiatry: A & O x 3, Mood & affect appropriate  Gastrointestinal:  Soft, Non-tender, + BS	  Skin: No rashes, No ecchymoses, No cyanosis	  Neurologic: Non-focal  Extremities: Normal range of motion, No clubbing, cyanosis or edema        LABS:	 	    CBC Full  -  ( 25 May 2019 13:20 )  WBC Count : 12.7 K/uL  Hemoglobin : 15.0 g/dL  Hematocrit : 44.4 %  Platelet Count - Automated : 139 K/uL  Mean Cell Volume : 101.0 fl  Mean Cell Hemoglobin : 34.4 pg  Mean Cell Hemoglobin Concentration : 33.9 gm/dL  Auto Neutrophil # : 9.6 K/uL  Auto Lymphocyte # : 1.8 K/uL  Auto Monocyte # : 1.1 K/uL  Auto Eosinophil # : 0.1 K/uL  Auto Basophil # : 0.1 K/uL  Auto Neutrophil % : 76.1 %  Auto Lymphocyte % : 13.9 %  Auto Monocyte % : 8.4 %  Auto Eosinophil % : 1.2 %  Auto Basophil % : 0.4 %        135  |  98  |  34<H>  ----------------------------<  300<H>  4.7   |  22  |  2.60<H>    Ca    9.6      25 May 2019 13:20    TPro  7.4  /  Alb  4.0  /  TBili  0.5  /  DBili  x   /  AST  20  /  ALT  22  /  AlkPhos  68        CARDIAC MARKERS:  hs trop 30 -> 30      TELEMETRY: 	  2:1 aflutter, HR 130s  EC:1 aflutter, HR 130s    PREVIOUS DIAGNOSTIC TESTING:    Echo   CONCLUSIONS:  1. Mitral annular calcification, otherwise normal mitral  valve. Minimal mitral regurgitation.  2. Normal left ventricular internal dimensionsand wall  thicknesses.  3. Endocardium not well visualized; grossly normal left  ventricular systolic function.  4. Mild diastolic dysfunction (Stage I).  5. Normal right ventricular size and function.    	  ASSESSMENT/PLAN: 	  79 yo M with afib/aflutter s/p cavotricuspid isthmus ablation  on coumadin, COPD, DM2, AAA s/p repair presented with recent falls and back pain, found to be in aflutter.    Atypical aflutter 2:1  - HR 130s, asymptomatic, HDS; likely in setting of back pain/dehydration, missed medication doses  - s/p 25mg PO metop (home dose), 10mg IV cardizem with minimal improvement in rate  - recommend hydration, s/p 1L, getting 2nd L now  - please give 5mg IV metop q5 min x3, give another 25mg PO metop tartrate  - cont with metop tart 50mg q12h, increase metop tartrate dose as tolerated for HR goal <120; will consider cardioversion if does not break  - repeat echo  - continue coumadin      Jose R Renee MD  Cardiology Fellow   [phone]    All Cardiology service information can be found on [email or web address], password: [email or web address]

## 2019-05-25 NOTE — ED PROVIDER NOTE - PSH
Basal Cell Cancer  excision left ear  H/O colonoscopy with polypectomy  2015 - + for malignancy  History of bone marrow biopsy    S/P aortic aneurysm repair  2010  S/P Lumbar Discectomy  in 2004  S/P tonsillectomy

## 2019-05-25 NOTE — H&P ADULT - HISTORY OF PRESENT ILLNESS
The pt is a 78 year old male with a PMH of T2DM, COPD, AF (on coumadin), CKD, BPH, early Parkinson's (gait abnormalities, fully alert and oriented, full capacity, some STM loss), and osteoporosis presents 2 days after a fall at home. The patient reports that 4 days ago he had an unwitnessed fall from the 2nd or 3rd step of a step stool. It was unwitnessed. He fell backwards and landed on his back. He reports he lost his footing when this occurred. He denies losing consciousness. He denies feeling lightheaded or dizzy during the event. He denied any chest pain, SOB, or palpitations during the event. The following day he went to his PCP's office and had xrays of his back which showed no acute findings. He returned home however 2 days ago he had another fall at 2AM in the morning. The patient does not recall exactly what happened however he reports he was lying in bed and went to get up to go to the bathroom. He slipped out of bed and fell onto the floor, striking the right side of his face on the bedside table. He started bleeding from a cut on his face near his mouth, however went to the bathroom and then went back to sleep. His wife reports that prior to getting up, he kept saying "my legs feel stiff.". The patient denies losing consciousness during this fall and his wife reports that he was himself and was not acting abnormally during this event. He came to the ED today because he has had persistent back pain since the first fall. He denies any previous falls in the past. The pt is a 78 year old male with a PMH of T2DM, COPD, AF/AFlutter (on coumadin, s/p ablation 2012), CKD, BPH, early Parkinson's (gait abnormalities, fully alert and oriented, full capacity, some STM loss), colon cancer (s/p colectomy) and osteoporosis presents 2 days after a fall at home. The patient reports that 4 days ago he had an unwitnessed fall from the 2nd or 3rd step of a step stool. It was unwitnessed. He fell backwards and landed on his back. He reports he lost his footing when this occurred. He denies losing consciousness. He denies feeling lightheaded or dizzy during the event. He denied any chest pain, SOB, or palpitations during the event. The following day he went to his PCP's office and had xrays of his back which showed no acute findings. He returned home however 2 days ago he had another fall at 2AM in the morning. The patient does not recall exactly what happened however he reports he was lying in bed and went to get up to go to the bathroom. He slipped out of bed and fell onto the floor, striking the right side of his face on the bedside table. He started bleeding from a cut on his face near his mouth, however went to the bathroom and then went back to sleep. His wife reports that prior to getting up, he kept saying "my legs feel stiff.". The patient denies losing consciousness during this fall and his wife reports that he was himself and was not acting abnormally during this event. He came to the ED today because he has had persistent back pain since the first fall. He denies any previous falls in the past. The pt is a 78 year old male with a PMH of T2DM, COPD, AF/AFlutter (on coumadin, s/p ablation 2012), CKD, BPH, early Parkinson's (gait abnormalities, fully alert and oriented, full capacity, some STM loss), CDIFF (2015), colon cancer (s/p colectomy) and osteoporosis presents 2 days after a fall at home. The patient reports that 4 days ago he had an unwitnessed fall from the 2nd or 3rd step of a step stool. It was unwitnessed. He fell backwards and landed on his back. He reports he lost his footing when this occurred. He denies losing consciousness. He denies feeling lightheaded or dizzy during the event. He denied any chest pain, SOB, or palpitations during the event. The following day he went to his PCP's office and had xrays of his back which showed no acute findings. He returned home however 2 days ago he had another fall at 2AM in the morning. The patient does not recall exactly what happened however he reports he was lying in bed and went to get up to go to the bathroom. He slipped out of bed and fell onto the floor, striking the right side of his face on the bedside table. He started bleeding from a cut on his face near his mouth, however went to the bathroom and then went back to sleep. His wife reports that prior to getting up, he kept saying "my legs feel stiff.". The patient denies losing consciousness during this fall and his wife reports that he was himself and was not acting abnormally during this event. He came to the ED today because he has had persistent back pain since the first fall. He denies any previous falls in the past. The pt is a 78 year old male with a PMH of T2DM, COPD, AF/AFlutter (on coumadin, s/p ablation 2012), CKD, BPH, early Parkinson's (gait abnormalities, fully alert and oriented, full capacity, some STM loss), CDIFF (2015), colon cancer (s/p colectomy) and osteoporosis presents 2 days after a fall at home. The patient reports that 4 days ago he had an unwitnessed fall from the 2nd or 3rd step of a step stool. It was unwitnessed. He fell backwards and landed on his back. He reports he lost his footing when this occurred. He denies losing consciousness. He denies feeling lightheaded or dizzy during the event. He denied any chest pain, SOB, or palpitations during the event. The following day he went to his PCP's office and had xrays of his back which showed no acute findings. He returned home however 2 days ago he had another fall at 2AM in the morning. The patient does not recall exactly what happened however he reports he was lying in bed and went to get up to go to the bathroom. He slipped out of bed and fell onto the floor, striking the right side of his face on the bedside table. He started bleeding from a cut on his face near his mouth, however went to the bathroom and then went back to sleep. His wife reports that prior to getting up, he kept saying "my legs feel stiff.". The patient denies losing consciousness during this fall and his wife reports that he was himself and was not acting abnormally during this event. He came to the ED today because he has had persistent back pain since the first fall. He denies any previous falls in the past. He and his wife endorse that the patient has had markedly decreased intake of food and water since his fall. The pt is a 78 year old male with a PMH of T2DM, COPD, AF/AFlutter (on coumadin, s/p ablation 2012), CKD, BPH, early Parkinson's (gait abnormalities, fully alert and oriented, full capacity, some STM loss), CDIFF (2015), colon cancer (s/p colectomy) and osteoporosis presents 2 days after a fall at home. The patient reports that 4 days ago he had an unwitnessed fall from the 2nd or 3rd step of a step stool. It was unwitnessed. He fell backwards and landed on his back. He reports he lost his footing when this occurred. He denies losing consciousness. He denies feeling lightheaded or dizzy during the event. He denied any chest pain, SOB, or palpitations during the event. The following day he went to his PCP's office and had xrays of his back which showed no acute findings. He returned home however 2 days ago he had another fall at 2AM in the morning. The patient does not recall exactly what happened however he reports he was lying in bed and went to get up to go to the bathroom. He slipped out of bed and fell onto the floor, striking the right side of his face on the bedside table. He started bleeding from a cut on his face near his mouth, however went to the bathroom and then went back to sleep. His wife reports that prior to getting up, he kept saying "my legs feel stiff.". The patient denies losing consciousness during this fall and his wife reports that he was himself and was not acting abnormally during this event. He came to the ED today because he has had persistent back pain since the first fall. He denies any previous falls in the past. He and his wife endorse that the patient has had markedly decreased intake of food and water since his fall.     In the ED vital signs: 99, 135, 130/85, 17, 95. The pt received LR, NS IVF. Pt received metoprolol tartrate and diltiazem. The pt is a 78 year old male with a PMH of AF/AFlutter (on coumadin, s/p ablation 2012), T2DM (last A1c 8.3 06/18) , COPD (not on home O2, never intubate/MICU), CKD, BPH, early Parkinson's (gait abnormalities, fully alert and oriented, full capacity, some STM loss), CDIFF (2015), colon cancer (s/p colectomy) and osteoporosis presents 2 days after a fall at home. The patient reports that 4 days ago he had an unwitnessed fall from the 2nd or 3rd step of a step stool. It was unwitnessed. He fell backwards and landed on his back. He reports he lost his footing when this occurred. He denies losing consciousness. He denies feeling lightheaded or dizzy during the event. He denied any chest pain, SOB, or palpitations during the event. The following day he went to his PCP's office and had xrays of his back which showed no acute findings. He returned home however 2 days ago he had another fall at 2AM in the morning. The patient does not recall exactly what happened however he reports he was lying in bed and went to get up to go to the bathroom. He slipped out of bed and fell onto the floor, striking the right side of his face on the bedside table. He started bleeding from a cut on his face near his mouth, however went to the bathroom and then went back to sleep. His wife reports that prior to getting up, he kept saying "my legs feel stiff.". The patient denies losing consciousness during this fall and his wife reports that he was himself and was not acting abnormally during this event. He came to the ED today because he has had persistent back pain since the first fall. He denies any previous falls in the past. He and his wife endorse that the patient has had markedly decreased intake of food and water since his fall.     In the ED vital signs: 99, 135, 130/85, 17, 95. The pt received LR, NS IVF. Pt received metoprolol tartrate and diltiazem. 78M PMH of AF/AFlutter (on coumadin, s/p ablation 2012), T2DM (last A1c 8.3 06/18) , COPD (not on home O2, never intubated/MICU), CKD, BPH, early Parkinson's (gait abnormalities, fully alert and oriented, full capacity, some STM loss), CDIFF (2015), colon cancer (s/p colectomy) and osteoporosis presents 2 days after a fall at home. The patient reports that 4 days ago he had an unwitnessed fall from the 2nd or 3rd step of a step stool. It was unwitnessed. He fell backwards and landed on his back. He reports he lost his footing when this occurred. He denies losing consciousness. He denies feeling lightheaded or dizzy during the event. He denied any chest pain, SOB, or palpitations during the event. The following day he went to his PCP's office and had xrays of his back which showed no acute findings. He returned home however 2 days ago he had another fall at 2AM in the morning. The patient does not recall exactly what happened however he reports he was lying in bed and went to get up to go to the bathroom. He slipped out of bed and fell onto the floor, striking the right side of his face on the bedside table. He started bleeding from a cut on his face near his mouth, however went to the bathroom and then went back to sleep. His wife reports that prior to getting up, he kept saying "my legs feel stiff.". The patient denies losing consciousness during this fall and his wife reports that he was himself and was not acting abnormally during this event. He came to the ED today because he has had persistent back pain since the first fall. He denies any previous falls in the past. He and his wife endorse that the patient has had markedly decreased intake of food and water since his fall.     In the ED vital signs: 99, 135, 130/85, 17, 95. The pt received LR, NS IVF. Pt received metoprolol tartrate and diltiazem. 78M PMH of AF/AFlutter (on coumadin, s/p ablation 2012), T2DM (last A1c 8.3 06/18) , COPD (not on home O2, never intubated/MICU), CKD, BPH, early Parkinson's (gait abnormalities, fully alert and oriented, full capacity, some STM loss), CDIFF (2015), colon cancer (s/p colectomy) and osteoporosis presents 2 days after a fall at home. The patient reports that 4 days ago he had an unwitnessed fall from the 2nd or 3rd step of a step stool. It was unwitnessed. He fell backwards and landed on his back. He reports he lost his footing when this occurred. He denies losing consciousness. He denies feeling lightheaded or dizzy during the event. He denied any chest pain, SOB, or palpitations during the event. The following day he went to his PCP's office and had xrays of his back which showed no acute findings. He returned home however 2 days ago he had another fall at 2AM in the morning. The patient does not recall exactly what happened however he reports he was lying in bed and went to get up to go to the bathroom. He slipped out of bed and fell onto the floor, striking the right side of his face on the bedside table. He started bleeding from a cut on his face near his mouth, however went to the bathroom and then went back to sleep. His wife reports that prior to getting up, he kept saying "my legs feel stiff." The patient denies losing consciousness during this fall and his wife reports that he was himself and was not acting abnormally during this event. He came to the ED today because he has had persistent back pain since the first fall. He denies any previous falls in the past. He and his wife endorse that the patient has had markedly decreased intake of food and water since his fall.     In the ED vital signs: 99, 135, 130/85, 17, 95. The pt received LR, NS IVF. Pt received metoprolol tartrate and diltiazem.

## 2019-05-25 NOTE — H&P ADULT - PROBLEM SELECTOR PLAN 2
-Unclear baseline. 2015 Cr in [place] ~1.5-2  -Likely prerenal given decreased PO intake however hydro on CT and hx of BPH  -No bladder distension on exam. Will order renal US to eval for hydro  -strict ins and outs -WBC 12.7 in setting of dehydration. No evidence of infection  -Will observe off abx. -WBC 12.7 in setting of dehydration, falls. No evidence of infection  -Will monitor off antibiotics at this time.

## 2019-05-25 NOTE — H&P ADULT - PROBLEM SELECTOR PLAN 6
-Will c/w home finasteride and tamsulosin -will do symbicort and duonebs q6 PRN  -c/w home montelukast

## 2019-05-25 NOTE — ED PROVIDER NOTE - PHYSICAL EXAMINATION
Attending Mila Carlos: Gen: NAD, heent: atrauamtic, eomi, perrla, mmm, op pink, uvula midline, neck; nttp, no nuchal rigidity, chest: nttp, no crepitus, cv: irregular, rapid, lungs: ctab, abd: soft, nontender, nondistended, no peritoneal signs, +BS, no guarding, ext: wwp, neg homans, skin: abrasion to nose, neuro: awake and alert, following commands, speech clear, sensation and strength intact, no focal deficits

## 2019-05-25 NOTE — H&P ADULT - NSHPREVIEWOFSYSTEMS_GEN_ALL_CORE
Constitutional: denies fevers, chills, night sweats, weight loss  HEENT: denies visual changes, cough  Cardiovascular: denies palpitations, chest pain, edema  Respiratory: denies SOB, wheezing  Gastrointestinal: denies N/V/D, abdominal pain, hematochezia, melena  : denies dysuria, urinary urgency, increased frequency  MSK: denies muscle weakness or instability  Skin: denies new rashes or masses  Heme: denies bleeding, bruising  Neuro: denies headache, weakness Constitutional: denies fevers, chills, night sweats, weight loss  HEENT: denies visual changes, cough  Cardiovascular: denies palpitations, chest pain, edema  Respiratory: denies SOB, wheezing  Gastrointestinal: denies N/V/D, abdominal pain, hematochezia, melena  : denies dysuria, urinary urgency, increased frequency  MSK: + upper back pain; denies muscle weakness or instability  Skin: denies new rashes or masses  Heme: denies bleeding, bruising  Neuro: denies headache, weakness  Psych: denies depression, anxiety Constitutional: denies fevers, chills, night sweats, weight loss  HEENT: denies visual changes, cough  Cardiovascular: denies palpitations, chest pain, edema  Respiratory: denies SOB, wheezing  Gastrointestinal: denies N/V/D, abdominal pain, hematochezia, melena  : denies dysuria, urinary urgency, increased frequency  MSK: +upper back pain; denies muscle weakness or instability  Skin: denies new rashes or masses  Heme: denies bleeding, bruising  Neuro: denies headache, weakness  Psych: denies depression, anxiety

## 2019-05-25 NOTE — H&P ADULT - PROBLEM SELECTOR PLAN 3
-WBC 12.7 in setting of dehydration. No evidence of infection  -Will observe off abx. -baseline Cr appears to 2.6 per HIE 2018  -hydro on CT and with hx of BPH  -No bladder distension on exam. Will order renal US to eval for hydro -baseline Cr appears to 2.6 per HIE [id number]  -hydronephrosis partially visualized on thoracic CT and with hx of BPH  -No bladder distension on exam. Will order renal US to eval for hydro. Can check bladder scan tonight.

## 2019-05-25 NOTE — ED ADULT NURSE NOTE - NSIMPLEMENTINTERV_GEN_ALL_ED
Implemented All Fall with Harm Risk Interventions:  [place] to call system. Call bell, personal items and telephone within reach. Instruct patient to call for assistance. Room bathroom lighting operational. Non-slip footwear when patient is off stretcher. Physically safe environment: no spills, clutter or unnecessary equipment. Stretcher in lowest position, wheels locked, appropriate side rails in place. Provide visual cue, wrist band, yellow gown, etc. Monitor gait and stability. Monitor for mental status changes and reorient to person, place, and time. Review medications for side effects contributing to fall risk. Reinforce activity limits and safety measures with patient and family. Provide visual clues: red socks.

## 2019-05-25 NOTE — ED PROVIDER NOTE - CLINICAL SUMMARY MEDICAL DECISION MAKING FREE TEXT BOX
Attending Mila Carlos: 79 y/o male with multiple medical issues presenting with upper back pain after mechanical fall. upon arrival pt noticed to be sig tachycardic. less likely secondary to pain. ekg shows atrial fibrillation. pt placed on monitor and given rate control wthout sig improvement. cards consulted as pt follwos with dr lofton. less likely tachcyardia from fall. no evidence of infectious etiology. will d/w cards. ct was performed showing no evidence of fracture or injury. will likely need admission for rate control

## 2019-05-25 NOTE — H&P ADULT - NSICDXPASTSURGICALHX_GEN_ALL_CORE_FT
PAST SURGICAL HISTORY:  Basal Cell Cancer excision left ear    H/O colonoscopy with polypectomy 2015 - + for malignancy    History of bone marrow biopsy     S/P aortic aneurysm repair 2010    S/P Lumbar Discectomy in 2004    S/P tonsillectomy

## 2019-05-25 NOTE — H&P ADULT - NSHPSOCIALHISTORY_GEN_ALL_CORE
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

## 2019-05-25 NOTE — H&P ADULT - PROBLEM SELECTOR PLAN 4
-On glipizide at home  -will send A1c and do low dose ISS -On glipizide at home. last A1c 8.3 06/18  -will send A1c and do low dose ISS

## 2019-05-25 NOTE — H&P ADULT - PROBLEM SELECTOR PLAN 1
-HR in the 120s-130s despite 2L IV and multiple doses of both IV and PO metoprolol tartrate as well as diltiazem  -May be 2/2 to dehydration. No evid of infection. No hypotension.  Asx. HST(-)  -EP on board, ok with HR in the 110s-120s if patient asymptomatic and HD stable  -Pt may require cardioversion this admission if tachycardia does not break  -If hypotensive, develops symptoms, or otherwise HD unstable will consider CCU admission for cardioversion.  -c/w home metoprolol tart 25 BID. S/p 2L IVF will hold off on further IVF  -c/w home coumadin 2.0 tonight, 2.5 on 5/26.   -monitor on tele. -HR in the 120s-130s despite 2L IV and multiple doses of both IV and PO metoprolol tartrate as well as diltiazem  -May be 2/2 to dehydration. No evid of infection. No hypotension.  Asx. HST(-)  -EP on board, ok with HR in the 110s-120s if patient asymptomatic and HD stable  -Pt may require cardioversion this admission if tachycardia does not break  -If hypotensive, develops symptoms, or otherwise HD unstable will consider CCU admission for cardioversion.  -c/w home metoprolol tart 25 BID.   -S/p 2L IVF will hold off on further IVF given crackles on lung exam  -c/w home coumadin 2.0 tonight, 2.5 on 5/26.   -monitor on tele. TTE ordered.

## 2019-05-25 NOTE — H&P ADULT - NSHPLABSRESULTS_GEN_ALL_CORE
CBC Full  -  ( 25 May 2019 13:20 )  WBC Count : 12.7 K/uL  RBC Count : 4.37 M/uL  Hemoglobin : 15.0 g/dL  Hematocrit : 44.4 %  Platelet Count - Automated : 139 K/uL  Mean Cell Volume : 101.0 fl  Mean Cell Hemoglobin : 34.4 pg  Mean Cell Hemoglobin Concentration : 33.9 gm/dL  Auto Neutrophil # : 9.6 K/uL  Auto Lymphocyte # : 1.8 K/uL  Auto Monocyte # : 1.1 K/uL  Auto Eosinophil # : 0.1 K/uL  Auto Basophil # : 0.1 K/uL  Auto Neutrophil % : 76.1 %  Auto Lymphocyte % : 13.9 %  Auto Monocyte % : 8.4 %  Auto Eosinophil % : 1.2 %  Auto Basophil % : 0.4 %    05-25    135  |  98  |  34<H>  ----------------------------<  300<H>  4.7   |  22  |  2.60<H>    Ca    9.6      25 May 2019 13:20    LIVER FUNCTIONS - ( 25 May 2019 13:20 )  Alb: 4.0 g/dL / Pro: 7.4 g/dL / ALK PHOS: 68 U/L / ALT: 22 U/L / AST: 20 U/L / GGT: x           PT/INR - ( 25 May 2019 13:20 )   PT: 32.6 sec;   INR: 2.77 ratio         PTT - ( 25 May 2019 13:20 )  PTT:39.3 sec CBC Full  -  ( 25 May 2019 13:20 )  WBC Count : 12.7 K/uL  RBC Count : 4.37 M/uL  Hemoglobin : 15.0 g/dL  Hematocrit : 44.4 %  Platelet Count - Automated : 139 K/uL  Mean Cell Volume : 101.0 fl  Mean Cell Hemoglobin : 34.4 pg  Mean Cell Hemoglobin Concentration : 33.9 gm/dL  Auto Neutrophil # : 9.6 K/uL  Auto Lymphocyte # : 1.8 K/uL  Auto Monocyte # : 1.1 K/uL  Auto Eosinophil # : 0.1 K/uL  Auto Basophil # : 0.1 K/uL  Auto Neutrophil % : 76.1 %  Auto Lymphocyte % : 13.9 %  Auto Monocyte % : 8.4 %  Auto Eosinophil % : 1.2 %  Auto Basophil % : 0.4 %        135  |  98  |  34<H>  ----------------------------<  300<H>  4.7   |  22  |  2.60<H>    Ca    9.6      25 May 2019 13:20    LIVER FUNCTIONS - ( 25 May 2019 13:20 )  Alb: 4.0 g/dL / Pro: 7.4 g/dL / ALK PHOS: 68 U/L / ALT: 22 U/L / AST: 20 U/L / GGT: x           PT/INR - ( 25 May 2019 13:20 )   PT: 32.6 sec;   INR: 2.77 ratio         PTT - ( 25 May 2019 13:20 )  PTT:39.3 sec    Urinalysis Basic - ( 25 May 2019 17:14 )    Color: Yellow / Appearance: Clear / S.018 / pH: x  Gluc: x / Ketone: Negative  / Bili: Negative / Urobili: Negative   Blood: x / Protein: 100 / Nitrite: Negative   Leuk Esterase: Negative / RBC: 3 /hpf / WBC 6 /HPF   Sq Epi: x / Non Sq Epi: 1 /hpf / Bacteria: Negative        CT HEAD:  No acute intracranial bleeding, mass effect, or shift.     CT THORACIC SPINE:   No acute compression fracture deformity.    Partially visualized marked hydronephrosis involving the left kidney.    CXR clear lungs    EKG reviewed by me:  Rate: 125  Rhythm: A flutter 2:1 block  Axis: LAD  MS: 154  QRS: 80  QTC:458  No [place]/STd. Multiple TWI's noted including V2-V6    HST 32-->30 EKG, labs, and imaging personally reviewed and interpreted.     EKG reviewed by me:  Rate: 125  Rhythm: A flutter 2:1 block  Axis: LAD  AL: 154  QRS: 80  QTC:458  No [place]/STd. Multiple TWI's noted including V2-V6    Labs:  CBC Full  -  ( 25 May 2019 13:20 )  WBC Count : 12.7 K/uL  RBC Count : 4.37 M/uL  Hemoglobin : 15.0 g/dL  Hematocrit : 44.4 %  Platelet Count - Automated : 139 K/uL  Mean Cell Volume : 101.0 fl  Mean Cell Hemoglobin : 34.4 pg  Mean Cell Hemoglobin Concentration : 33.9 gm/dL  Auto Neutrophil # : 9.6 K/uL  Auto Lymphocyte # : 1.8 K/uL  Auto Monocyte # : 1.1 K/uL  Auto Eosinophil # : 0.1 K/uL  Auto Basophil # : 0.1 K/uL  Auto Neutrophil % : 76.1 %  Auto Lymphocyte % : 13.9 %  Auto Monocyte % : 8.4 %  Auto Eosinophil % : 1.2 %  Auto Basophil % : 0.4 %      135  |  98  |  34<H>  ----------------------------<  300<H>  4.7   |  22  |  2.60<H>    Ca    9.6      25 May 2019 13:20    LIVER FUNCTIONS - ( 25 May 2019 13:20 )  Alb: 4.0 g/dL / Pro: 7.4 g/dL / ALK PHOS: 68 U/L / ALT: 22 U/L / AST: 20 U/L / GGT: x           PT/INR - ( 25 May 2019 13:20 )   PT: 32.6 sec;   INR: 2.77 ratio    PTT - ( 25 May 2019 13:20 )  PTT:39.3 sec    HST 32-->30    Urinalysis Basic - ( 25 May 2019 17:14 )    Color: Yellow / Appearance: Clear / S.018 / pH: x  Gluc: x / Ketone: Negative  / Bili: Negative / Urobili: Negative   Blood: x / Protein: 100 / Nitrite: Negative   Leuk Esterase: Negative / RBC: 3 /hpf / WBC 6 /HPF   Sq Epi: x / Non Sq Epi: 1 /hpf / Bacteria: Negative    IMAGING:   CT HEAD:  No acute intracranial bleeding, mass effect, or shift.     CT THORACIC SPINE:   No acute compression fracture deformity.    Partially visualized marked hydronephrosis involving the left kidney.    CXR clear lungs    Care discussed with other providers: Yes  Consult notes reviewed: Yes  Outpatient/prior hospitalization records reviewed: Yes

## 2019-05-25 NOTE — H&P ADULT - PROBLEM SELECTOR PLAN 7
-DVT prophylaxis with warfarin  -Regular diet CC -DVT prophylaxis with warfarin  -Regular diet CC  -PT consult -Will c/w home finasteride and tamsulosin

## 2019-05-25 NOTE — H&P ADULT - ASSESSMENT
The pt is a 78 year old male with a PMH of T2DM, COPD, AF/AFlutter (on coumadin, s/p ablation 2012), CKD, BPH, early Parkinson's (gait abnormalities, fully alert and oriented, full capacity, some STM loss), CDIFF (2015), colon cancer (s/p colectomy) and osteoporosis presents 2 days after a fall at home found to be in AF with RVR likely 2/2 to dehydration from decreased PO intake since fall. The pt is a 78 year old male with a PMH of PMH of AF/AFlutter (on coumadin, s/p ablation 2012), T2DM (last A1c 8.3 06/18), COPD (not on home O2, never intubate/MICU), CKD, BPH, early Parkinson's (gait abnormalities, fully alert and oriented, full capacity, some STM loss), CDIFF (2015), colon cancer (s/p colectomy) and osteoporosis presents 2 days after a fall at home found to be in AF with RVR likely 2/2 to dehydration from decreased PO intake since fall. 78M PMH of AF/AFlutter (on coumadin, s/p ablation 2012), T2DM (last A1c 8.3 06/18), COPD (not on home O2, never intubated/MICU), CKD, BPH, early Parkinson's (gait abnormalities, fully alert and oriented, full capacity, some STM loss), CDIFF (2015), colon cancer (s/p colectomy) and osteoporosis presents 2 days after a fall at home found to be in AF with RVR likely 2/2 to dehydration from decreased PO intake since fall.

## 2019-05-25 NOTE — ED PROVIDER NOTE - PMH
Aneurysm  Abdominal aortic aneurysm  Basal Cell Cancer  s/p surgery left ear  BPH (Benign Prostatic Hyperplasia)    Cardiac Arrhythmia  atrial fibrillation  Colon cancer    COPD (Chronic Obstructive Pulmonary Disease)    Diabetes mellitus  Type 2  Hyperlipidemia    Renal Cyst  left renal  Renal Insufficiency

## 2019-05-25 NOTE — ED ADULT NURSE REASSESSMENT NOTE - NS ED NURSE REASSESS COMMENT FT1
Received report from ED RN John; patient A7ox3, afebrile- denies chest pain, dizziness and SOB- medicated for HR, sinus tachycardia on the monitor. 18 g IV in R AC patent and site WNL. Patient admitted and waiting for bed assignment.

## 2019-05-25 NOTE — ED PROVIDER NOTE - OBJECTIVE STATEMENT
79yo M with PMH COPD, DM, AAA s/p repair, A.fib on coumadin, presenting with back pain s/p fall 4 days ago. Pt reports trip and fall backwards off of 1 step, falling directly onto back. No head injury or LOC at this time. Reports upper back pain after fall, f/u with PMD and had negative Xrays of chest and spine. Pt and wife at bedside reports pt then fell out of bed 2 days ago. Pt does not recall this. + abrasion to face. Pt reports he is taking tylenol and tramadol with relief of back pain. Also reports generalized weakness and decreased PO intake over last few days and occasional lightheadedness when he gets up. Denies fever/chills, HA, neck pain, vision changes CP/palpitations prior to fall or now, dizziness prior to fall, SOB, abd pain, n/v/d, melena, BRBPR, hematuria, dysuria, numbness/tingling, focal weakness.    PCP Shaina Esparza 77yo M with PMH COPD, DM, AAA s/p repair, A.fib on coumadin, presenting with back pain s/p fall 4 days ago. Pt reports trip and fall backwards off of 1 step, falling directly onto back. No head injury or LOC at this time. Reports upper back pain after fall, f/u with PMD and had negative Xrays of chest and spine. Pt and wife at bedside reports pt then fell out of bed 2 days ago. Pt does not recall this. + abrasion to face. Pt reports he is taking tylenol and tramadol with relief of back pain. Also reports generalized weakness and decreased appetite over last few days and occasional lightheadedness when he gets up. Denies fever/chills, HA, neck pain, vision changes CP/palpitations prior to fall or now, dizziness prior to fall, SOB, abd pain, n/v/d, melena, BRBPR, hematuria, dysuria, numbness/tingling, focal weakness. Of note, reports he did not take his medications today.       PCP Shaina Esparza

## 2019-05-25 NOTE — ED PROVIDER NOTE - SKIN, MLM
See face and MSK exam; Skin otherwise normal color for race, warm, dry and intact. No evidence of rash.

## 2019-05-25 NOTE — H&P ADULT - NSICDXPASTMEDICALHX_GEN_ALL_CORE_FT
PAST MEDICAL HISTORY:  Aneurysm Abdominal aortic aneurysm    Basal Cell Cancer s/p surgery left ear    BPH (Benign Prostatic Hyperplasia)     Cardiac Arrhythmia atrial fibrillation    Colon cancer     COPD (Chronic Obstructive Pulmonary Disease)     Diabetes mellitus Type 2    Hyperlipidemia     Renal Cyst left renal    Renal Insufficiency

## 2019-05-25 NOTE — ED PROVIDER NOTE - ATTENDING CONTRIBUTION TO CARE
Attending MD Mila Carlos:   I personally have seen and examined this patient.  Physician assistant note reviewed and agree on plan of care and except where noted.  See HPI, PE, and MDM for details.

## 2019-05-25 NOTE — ED PROVIDER NOTE - PROGRESS NOTE DETAILS
Attending Mila Carlos: rectal temp wnl. pt denies any complaints currently Attending Mila Carlos: pt in persistent afib/flutter. given home dose of metoprolol. will need rate control Attending Mila Carlos: pt given cardizem with improvement in rates. delta trop pending,. slight elevation could be rate related. pt states has tramadol and tylenol at home and prefers to go home if imaging negative. afebrile. no evidence of infection. tetanus utd. d/w pt elevated creatinine and abnormal ekg, states normal for him. will give him copies of results if discharged RACIEL Lozano: cardiology recommended lopressor 5mg IV followed by additional lopressor 25mg PO RACIEL Lozano: cardiology recommended lopressor 5mg IV followed by additional lopressor 25mg PO    Attending Statement: Agree with the above.  Clarified c fellow safety of lopressor given recent cardizem IV.  Fellow comfortable c lopressor given duration of action of IV cardizem and normal BP.  Will closely monitor after lopressor IV given.  --BMM No significant HR improvement c lopressor 5 IV.  Will give 25mg PO, admit.  H/H 15/45, no flank/abd wall ecchymosis/hematoma, abd entirely soft ntnd.  Low suspicion for occult intra-abdominal bleed causing tachycardia.  Will admit to hospitalist/tele and c/t monitor while in ED.  --BMM

## 2019-05-26 DIAGNOSIS — Z90.49 ACQUIRED ABSENCE OF OTHER SPECIFIED PARTS OF DIGESTIVE TRACT: Chronic | ICD-10-CM

## 2019-05-26 DIAGNOSIS — R29.6 REPEATED FALLS: ICD-10-CM

## 2019-05-26 LAB
ANION GAP SERPL CALC-SCNC: 16 MMOL/L — SIGNIFICANT CHANGE UP (ref 5–17)
BASOPHILS # BLD AUTO: 0.09 K/UL — SIGNIFICANT CHANGE UP (ref 0–0.2)
BASOPHILS NFR BLD AUTO: 0.7 % — SIGNIFICANT CHANGE UP (ref 0–2)
BUN SERPL-MCNC: 28 MG/DL — HIGH (ref 7–23)
CALCIUM SERPL-MCNC: 9.3 MG/DL — SIGNIFICANT CHANGE UP (ref 8.4–10.5)
CHLORIDE SERPL-SCNC: 101 MMOL/L — SIGNIFICANT CHANGE UP (ref 96–108)
CK MB BLD-MCNC: 3 % — SIGNIFICANT CHANGE UP (ref 0–3.5)
CK MB CFR SERPL CALC: 3.1 NG/ML — SIGNIFICANT CHANGE UP (ref 0–6.7)
CK SERPL-CCNC: 103 U/L — SIGNIFICANT CHANGE UP (ref 30–200)
CO2 SERPL-SCNC: 21 MMOL/L — LOW (ref 22–31)
CREAT SERPL-MCNC: 2.21 MG/DL — HIGH (ref 0.5–1.3)
EOSINOPHIL # BLD AUTO: 0.39 K/UL — SIGNIFICANT CHANGE UP (ref 0–0.5)
EOSINOPHIL NFR BLD AUTO: 3.1 % — SIGNIFICANT CHANGE UP (ref 0–6)
GLUCOSE BLDC GLUCOMTR-MCNC: 148 MG/DL — HIGH (ref 70–99)
GLUCOSE BLDC GLUCOMTR-MCNC: 217 MG/DL — HIGH (ref 70–99)
GLUCOSE BLDC GLUCOMTR-MCNC: 230 MG/DL — HIGH (ref 70–99)
GLUCOSE BLDC GLUCOMTR-MCNC: 250 MG/DL — HIGH (ref 70–99)
GLUCOSE SERPL-MCNC: 142 MG/DL — HIGH (ref 70–99)
HBA1C BLD-MCNC: 8.4 % — HIGH (ref 4–5.6)
HCT VFR BLD CALC: 45.7 % — SIGNIFICANT CHANGE UP (ref 39–50)
HGB BLD-MCNC: 14.7 G/DL — SIGNIFICANT CHANGE UP (ref 13–17)
IMM GRANULOCYTES NFR BLD AUTO: 1.6 % — HIGH (ref 0–1.5)
INR BLD: 2.76 RATIO — HIGH (ref 0.88–1.16)
LYMPHOCYTES # BLD AUTO: 2.49 K/UL — SIGNIFICANT CHANGE UP (ref 1–3.3)
LYMPHOCYTES # BLD AUTO: 20 % — SIGNIFICANT CHANGE UP (ref 13–44)
MAGNESIUM SERPL-MCNC: 2.1 MG/DL — SIGNIFICANT CHANGE UP (ref 1.6–2.6)
MCHC RBC-ENTMCNC: 32.2 GM/DL — SIGNIFICANT CHANGE UP (ref 32–36)
MCHC RBC-ENTMCNC: 33.3 PG — SIGNIFICANT CHANGE UP (ref 27–34)
MCV RBC AUTO: 103.6 FL — HIGH (ref 80–100)
MONOCYTES # BLD AUTO: 1 K/UL — HIGH (ref 0–0.9)
MONOCYTES NFR BLD AUTO: 8 % — SIGNIFICANT CHANGE UP (ref 2–14)
NEUTROPHILS # BLD AUTO: 8.3 K/UL — HIGH (ref 1.8–7.4)
NEUTROPHILS NFR BLD AUTO: 66.6 % — SIGNIFICANT CHANGE UP (ref 43–77)
PHOSPHATE SERPL-MCNC: 3 MG/DL — SIGNIFICANT CHANGE UP (ref 2.5–4.5)
PLATELET # BLD AUTO: 156 K/UL — SIGNIFICANT CHANGE UP (ref 150–400)
POTASSIUM SERPL-MCNC: 4.4 MMOL/L — SIGNIFICANT CHANGE UP (ref 3.5–5.3)
POTASSIUM SERPL-SCNC: 4.4 MMOL/L — SIGNIFICANT CHANGE UP (ref 3.5–5.3)
PROTHROM AB SERPL-ACNC: 32.8 SEC — HIGH (ref 10–13.1)
RBC # BLD: 4.41 M/UL — SIGNIFICANT CHANGE UP (ref 4.2–5.8)
RBC # FLD: 12.7 % — SIGNIFICANT CHANGE UP (ref 10.3–14.5)
SODIUM SERPL-SCNC: 138 MMOL/L — SIGNIFICANT CHANGE UP (ref 135–145)
TROPONIN T, HIGH SENSITIVITY RESULT: 27 NG/L — SIGNIFICANT CHANGE UP (ref 0–51)
TSH SERPL-MCNC: 1.05 UIU/ML — SIGNIFICANT CHANGE UP (ref 0.27–4.2)
WBC # BLD: 12.47 K/UL — HIGH (ref 3.8–10.5)
WBC # FLD AUTO: 12.47 K/UL — HIGH (ref 3.8–10.5)

## 2019-05-26 PROCEDURE — 93010 ELECTROCARDIOGRAM REPORT: CPT

## 2019-05-26 PROCEDURE — 99233 SBSQ HOSP IP/OBS HIGH 50: CPT

## 2019-05-26 PROCEDURE — 99232 SBSQ HOSP IP/OBS MODERATE 35: CPT

## 2019-05-26 RX ORDER — METOPROLOL TARTRATE 50 MG
50 TABLET ORAL EVERY 6 HOURS
Refills: 0 | Status: DISCONTINUED | OUTPATIENT
Start: 2019-05-26 | End: 2019-05-27

## 2019-05-26 RX ORDER — WARFARIN SODIUM 2.5 MG/1
2 TABLET ORAL ONCE
Refills: 0 | Status: DISCONTINUED | OUTPATIENT
Start: 2019-05-26 | End: 2019-05-26

## 2019-05-26 RX ORDER — WARFARIN SODIUM 2.5 MG/1
2.5 TABLET ORAL ONCE
Refills: 0 | Status: COMPLETED | OUTPATIENT
Start: 2019-05-26 | End: 2019-05-26

## 2019-05-26 RX ORDER — SODIUM CHLORIDE 9 MG/ML
1000 INJECTION INTRAMUSCULAR; INTRAVENOUS; SUBCUTANEOUS
Refills: 0 | Status: DISCONTINUED | OUTPATIENT
Start: 2019-05-26 | End: 2019-05-27

## 2019-05-26 RX ORDER — TRAMADOL HYDROCHLORIDE 50 MG/1
50 TABLET ORAL
Refills: 0 | Status: DISCONTINUED | OUTPATIENT
Start: 2019-05-26 | End: 2019-05-28

## 2019-05-26 RX ORDER — METOPROLOL TARTRATE 50 MG
50 TABLET ORAL EVERY 12 HOURS
Refills: 0 | Status: DISCONTINUED | OUTPATIENT
Start: 2019-05-26 | End: 2019-05-26

## 2019-05-26 RX ADMIN — MONTELUKAST 10 MILLIGRAM(S): 4 TABLET, CHEWABLE ORAL at 12:00

## 2019-05-26 RX ADMIN — TRAMADOL HYDROCHLORIDE 50 MILLIGRAM(S): 50 TABLET ORAL at 23:03

## 2019-05-26 RX ADMIN — WARFARIN SODIUM 2.5 MILLIGRAM(S): 2.5 TABLET ORAL at 23:03

## 2019-05-26 RX ADMIN — FINASTERIDE 5 MILLIGRAM(S): 5 TABLET, FILM COATED ORAL at 12:00

## 2019-05-26 RX ADMIN — Medication 2: at 17:59

## 2019-05-26 RX ADMIN — BUDESONIDE AND FORMOTEROL FUMARATE DIHYDRATE 2 PUFF(S): 160; 4.5 AEROSOL RESPIRATORY (INHALATION) at 18:03

## 2019-05-26 RX ADMIN — SODIUM CHLORIDE 75 MILLILITER(S): 9 INJECTION INTRAMUSCULAR; INTRAVENOUS; SUBCUTANEOUS at 19:44

## 2019-05-26 RX ADMIN — LIDOCAINE 1 PATCH: 4 CREAM TOPICAL at 01:00

## 2019-05-26 RX ADMIN — Medication 5 MILLIGRAM(S): at 23:03

## 2019-05-26 RX ADMIN — Medication 50 MILLIGRAM(S): at 23:03

## 2019-05-26 RX ADMIN — Medication 50 MILLIGRAM(S): at 18:00

## 2019-05-26 RX ADMIN — BUDESONIDE AND FORMOTEROL FUMARATE DIHYDRATE 2 PUFF(S): 160; 4.5 AEROSOL RESPIRATORY (INHALATION) at 05:43

## 2019-05-26 RX ADMIN — TAMSULOSIN HYDROCHLORIDE 0.8 MILLIGRAM(S): 0.4 CAPSULE ORAL at 23:03

## 2019-05-26 RX ADMIN — TRAMADOL HYDROCHLORIDE 50 MILLIGRAM(S): 50 TABLET ORAL at 14:18

## 2019-05-26 RX ADMIN — TRAMADOL HYDROCHLORIDE 50 MILLIGRAM(S): 50 TABLET ORAL at 14:48

## 2019-05-26 RX ADMIN — Medication 50 MILLIGRAM(S): at 11:59

## 2019-05-26 RX ADMIN — TRAMADOL HYDROCHLORIDE 50 MILLIGRAM(S): 50 TABLET ORAL at 23:33

## 2019-05-26 RX ADMIN — SODIUM CHLORIDE 75 MILLILITER(S): 9 INJECTION INTRAMUSCULAR; INTRAVENOUS; SUBCUTANEOUS at 11:59

## 2019-05-26 RX ADMIN — Medication 50 MILLIGRAM(S): at 05:43

## 2019-05-26 RX ADMIN — Medication 2: at 12:35

## 2019-05-26 NOTE — PROGRESS NOTE ADULT - PROBLEM SELECTOR PLAN 5
-Description of falls sounds mechanical, likely in the setting of Parkinson's. ?Also possibly precipitated by uncontrolled Aflutter. ?Possibly due to orthostatic changes.   -C/w telemetry.   -Echo pending.   -Orthostatics were positive, but not symptomatic. Will monitor.  -Tramadol PRN for pains for now, patient says he takes it at home, however not found in iSTOP. If becomes sedated, may need to stop this as this could also contribute to falls. Patient denies that this has been a problem in the past.  -Fall precautions.

## 2019-05-26 NOTE — PROGRESS NOTE ADULT - ASSESSMENT
78M PMH of AF/AFlutter (on coumadin, s/p ablation 2012), T2DM (last A1c 8.3 06/18), COPD (not on home O2, never intubated/MICU), CKD, BPH, early Parkinson's (gait abnormalities, fully alert and oriented, full capacity, some STM loss), CDIFF (2015), colon cancer (s/p colectomy) and osteoporosis; presented 2 days after a fall at home found to be in AFlutter with RVR likely 2/2 to dehydration from decreased PO intake since fall.

## 2019-05-26 NOTE — PROGRESS NOTE ADULT - SUBJECTIVE AND OBJECTIVE BOX
Subjective  Patient has no chest discomfort, dyspnea, palpitations, lightheadedness, syncope, fatigue.  He does have back pain after falling off ladder.  He is asymptomatic from his atrial flutter.   Past Medical History    PAST MEDICAL & SURGICAL HISTORY:  Hyperlipidemia  Colon cancer  Diabetes mellitus: Type 2  Basal Cell Cancer: s/p surgery left ear  Renal Insufficiency  Renal Cyst: left renal  Aneurysm: Abdominal aortic aneurysm  BPH (Benign Prostatic Hyperplasia)  COPD (Chronic Obstructive Pulmonary Disease)  Cardiac Arrhythmia: atrial fibrillation  H/O colectomy: open right colectomy  History of bone marrow biopsy  H/O colonoscopy with polypectomy:  - + for malignancy  S/P tonsillectomy  S/P aortic aneurysm repair:   Basal Cell Cancer: excision left ear  S/P Lumbar Discectomy: in       MEDICATIONS:  metoprolol tartrate 50 milliGRAM(s) Oral every 6 hours  tamsulosin 0.8 milliGRAM(s) Oral at bedtime      ALBUTerol/ipratropium for Nebulization 3 milliLiter(s) Nebulizer every 6 hours PRN  buDESOnide 160 MICROgram(s)/formoterol 4.5 MICROgram(s) Inhaler 2 Puff(s) Inhalation two times a day  montelukast 10 milliGRAM(s) Oral daily    acetaminophen   Tablet .. 650 milliGRAM(s) Oral every 6 hours PRN  diphenhydrAMINE 25 milliGRAM(s) Oral at bedtime PRN  melatonin 5 milliGRAM(s) Oral at bedtime      dextrose 40% Gel 15 Gram(s) Oral once PRN  dextrose 50% Injectable 12.5 Gram(s) IV Push once  dextrose 50% Injectable 25 Gram(s) IV Push once  dextrose 50% Injectable 25 Gram(s) IV Push once  finasteride 5 milliGRAM(s) Oral daily  glucagon  Injectable 1 milliGRAM(s) IntraMuscular once PRN  insulin lispro (HumaLOG) corrective regimen sliding scale   SubCutaneous three times a day before meals  insulin lispro (HumaLOG) corrective regimen sliding scale   SubCutaneous at bedtime    dextrose 5%. 1000 milliLiter(s) IV Continuous <Continuous>        Allergies    amoxicillin (Pruritus; Rash)  nitrofurantoin (Unknown)  penicillins (Pruritus; Rash)    Intolerances    clindamycin (Stomach Upset)      FAMILY HISTORY:  Family history of colon cancer      SOCIAL HISTORY    Marital Status:   Occupation:   Lives with:     SUBSTANCE USE  Tobacco Usage:  ( ) never smoked   ( ) former smoker  ( ) current smoker; Packs per day:   Alcohol Usage: ( ) none  ( ) occasional ( ) 2-3 times a week ( ) daily; Last drink:   Recreational drugs ( ) None    REVIEW OF SYSTEMS:    CONSTITUTIONAL: No fevers, No chills, No fatigue, No weight gain  EYES: No vision changes   ENT: No congestion, No ear pain, No sore throat.  NECK: No pain, No stiffness  RESPIRATORY: No shortness of breath, No cough, No wheezing, No hemoptysis  CARDIOVASCULAR: No chest pain. No palpitations, No ROBISON, No orthopnea, No paroxysmal nocturnal dyspnea, No pleuritic pain  GASTROINTESTINAL: No abdominal pain, No nausea, No vomiting, No hematemesis, No diarrhea No constipation. No melena  GENITOURINARY: No dysuria, No frequency, No incontinence, No hematuria  NEUROLOGICAL: No dizziness, No lightheadedness, No syncope, No LOC, No headache, No numbness or weakness  EXTREMITIES: No Edema, No joint pain, No joint swelling.  PSYCHIATRIC: No anxiety, No depression  SKIN: No diaphoresis. No itching, No rashes, No pressure ulcers    All other review of systems is negative unless indicated above.    VITAL SIGNS  T(C): 36.6 (19 @ 08:42), Max: 37.2 (19 @ 13:41)  HR: 133 (19 @ 08:42) (100 - 135)  BP: 103/73 (19 @ 08:42) (103/73 - 143/93)  RR: 16 (19 @ 08:42) (16 - 18)  SpO2: 95% (19 @ 08:42) (93% - 96%)  Wt(kg): --    PHYSICAL EXAM:    Appearance: NAD, no distress  HEENT:   Normal oral mucosa, PERRL, EOMI  Cardiovascular: Irregular rate and rapid rhythm, Normal S1 S2, No JVD, No murmurs, No edema  Respiratory: Lungs clear to auscultation. No rales, No rhonchi, No wheezing. No tenderness to palpation  Gastrointestinal:  Soft, Non-tender, + BS  Neurologic: Non-focal, A&Ox3  Skin: Warm and dry, No rashes, No ecchymoses, No cyanosis  Extremities: No clubbing, cyanosis or edema  Vascular: Peripheral pulses palpable 2+ bilaterally  Psychiatry: Mood & affect appropriate      	    		        I&O's Summary    25 May 2019 07:  -  26 May 2019 07:00  --------------------------------------------------------  IN: 0 mL / OUT: 175 mL / NET: -175 mL    26 May 2019 07:  -  26 May 2019 10:00  --------------------------------------------------------  IN: 240 mL / OUT: 200 mL / NET: 40 mL        LABORATORY VALUES	 	                          14.7   12.47 )-----------( 156      ( 26 May 2019 09:33 )             45.7           138  |  101  |  28<H>  ----------------------------<  142<H>  4.4   |  21<L>  |  2.21<H>      135  |  98  |  34<H>  ----------------------------<  300<H>  4.7   |  22  |  2.60<H>    Ca    9.3      26 May 2019 06:01  Ca    9.6      25 May 2019 13:20  Phos  3.0       Mg     2.1         TPro  7.4  /  Alb  4.0  /  TBili  0.5  /  DBili  x   /  AST  20  /  ALT  22  /  AlkPhos  68      LIVER FUNCTIONS - ( 25 May 2019 13:20 )  Alb: 4.0 g/dL / Pro: 7.4 g/dL / ALK PHOS: 68 U/L / ALT: 22 U/L / AST: 20 U/L / GGT: x           Prothrombin Time, Plasma: 32.6 sec ( @ 13:20)      CARDIAC MARKERS:                          Urinalysis Basic - ( 25 May 2019 17:14 )    Color: Yellow / Appearance: Clear / S.018 / pH: x  Gluc: x / Ketone: Negative  / Bili: Negative / Urobili: Negative   Blood: x / Protein: 100 / Nitrite: Negative   Leuk Esterase: Negative / RBC: 3 /hpf / WBC 6 /HPF   Sq Epi: x / Non Sq Epi: 1 /hpf / Bacteria: Negative      CAPILLARY BLOOD GLUCOSE      POCT Blood Glucose.: 148 mg/dL (26 May 2019 07:44)          TELEMETRY: 	  atrial flutter with rates up to the 130s  ECG:  	atypical atrial flutter  RADIOLOGY:  OTHER: 	    PREVIOUS DIAGNOSTIC TESTING:    [ ] Echocardiogram:< from: Transthoracic Echocardiogram w/Doppler (08.10.12 @ 07:37) >  Patient name: LEAH HOLLAND  YOB: 1940   Age: 71 (M)   MR#: [id number]  Study Date: 8/10/2012  [facility]  Study quality: Technically difficult  Referring Physician: Charli Hinds MD  Blood Pressure: 113/78 mmHg  Height: 5ft 10in  Weight: 150 lb  BSA: 1.9 m2  ------------------------------------------------------------------------  PROCEDURE: Transthoracic echocardiogram with 2-D, M-Mode  and complete spectral and color flow Doppler.  INDICATION: Palpitations (785.1)  ------------------------------------------------------------------------  DIMENSIONS:  Dimensions:     Normal Values:  LA:     3.0 cm    2.0 - 4.0 cm  Ao:     3.4 cm    2.0 - 3.8 cm  SEPTUM: 0.8 cm    0.6 - 1.2 cm  PWT:    0.7 cm    0.6 - 1.1 cm  LVIDd:  4.6 cm    3.0 - 5.6 cm  LVIDs:  3.1 cm    1.8 - 4.0 cm  Derived Variables:  LVMI: 59 g/m2  RWT: 0.30  Fractional short: 33 %  Ejection Fraction: 61 %  ------------------------------------------------------------------------  OBSERVATIONS:  Mitral Valve: Mitral annular calcification, otherwise  normal mitral valve. Minimal mitral regurgitation.  Aortic Root: Normal aortic root.  Aortic Valve: Calcified trileaflet aortic valve with normal  opening.  Left Ventricle: Endocardium not well visualized; grossly  normal left ventricular systolic function. Normal left  ventricular internal dimensions and wall thicknesses. Mild  diastolic dysfunction (Stage I).  Right Heart: Normal right atrium. Normal right ventricular  size and function. Normal tricuspid valve.  Minimal  tricuspid regurgitation. Normal pulmonic valve. Minimal  pulmonic regurgitation.  Pericardium/PleuraNormal pericardium with no pericardial  effusion.  Hemodynamic: Estimated right atrial pressure is 10 mm Hg.  ------------------------------------------------------------------------  CONCLUSIONS:  1. Mitral annular calcification, otherwise normal mitral  valve. Minimal mitral regurgitation.  2. Normal left ventricular internal dimensions and wall  thicknesses.  3. Endocardium not well visualized; grossly normal left  ventricular systolic function.  4. Mild diastolic dysfunction (Stage I).  5. Normal right ventricular size and function.  ------------------------------------------------------------------------  Confirmed on  8/10/2012 - 08:35:45 by Isaias Hilton M.D.    < end of copied text >    [ ] Catheterization:  [ ] Stress Test:

## 2019-05-26 NOTE — PROGRESS NOTE ADULT - ASSESSMENT
78M h/o AF/AFlutter, ablation in 2012, , CKD, BPH, early Parkinson's, colon cancer s/p colectomy, now here with atypical atrial flutter with rapid ventricular response.  Recommend increasing metoprolol to 50 mg PO Q6 hours, obtaining an echocardiogram and scheduling a [facility] D[facility]V on May 28, 2019.  Will continue warfarin for now.

## 2019-05-26 NOTE — PROGRESS NOTE ADULT - PROBLEM SELECTOR PLAN 3
-JUAN A on CKD appears likely secondary to dehydration. -Improving with IVF's.   -Baseline Cr appears to 2.6 per HIE [id number]  -Hydronephrosis partially visualized on thoracic CT and with hx of BPH  -Renal US to eval the hydronephrosis. Can bladder scan to monitor for any retention.

## 2019-05-26 NOTE — CHART NOTE - NSCHARTNOTEFT_GEN_A_CORE
This report was requested by: Oscar Swain | Reference #: [id number]    There are no results for the search terms that you entered.

## 2019-05-26 NOTE — PROGRESS NOTE ADULT - PROBLEM SELECTOR PLAN 1
-On admission HR in the 120s-130s despite 2L IV and multiple doses of both IV and PO metoprolol tartrate as well as diltiazem.  -However, today gave some more IVF's and EPS increased metoprolol to 50mg PO Q6H and patient converted to Sinus rhythm.   -May have been 2/2 to dehydration. Trops negative.   -EPS recs appreciated; DCCV planned for 5/28.   -C/w coumadin 2.5mg tonight. -Patient alternates 2mg/2.5mg at home. -F/u INR tomorrow.   -C/w telemetry. TTE ordered.  -TSH normal.

## 2019-05-26 NOTE — PROGRESS NOTE ADULT - ATTENDING COMMENTS
Oscar Swain MD  Division of [facility] Medicine  Cell: [phone]  Pager: [phone]  Office: [phone]

## 2019-05-26 NOTE — PROGRESS NOTE ADULT - SUBJECTIVE AND OBJECTIVE BOX
Patient is a 78y old  Male who presents with a chief complaint of s/p fall (26 May 2019 10:48)        SUBJECTIVE / OVERNIGHT EVENTS: Patient denies any CP or SOB or feeling any palpitations. He reports feeling a little achy and some cough.       MEDICATIONS  (STANDING):  buDESOnide 160 MICROgram(s)/formoterol 4.5 MICROgram(s) Inhaler 2 Puff(s) Inhalation two times a day  dextrose 5%. 1000 milliLiter(s) (50 mL/Hr) IV Continuous <Continuous>  dextrose 50% Injectable 12.5 Gram(s) IV Push once  dextrose 50% Injectable 25 Gram(s) IV Push once  dextrose 50% Injectable 25 Gram(s) IV Push once  finasteride 5 milliGRAM(s) Oral daily  insulin lispro (HumaLOG) corrective regimen sliding scale   SubCutaneous three times a day before meals  insulin lispro (HumaLOG) corrective regimen sliding scale   SubCutaneous at bedtime  melatonin 5 milliGRAM(s) Oral at bedtime  metoprolol tartrate 50 milliGRAM(s) Oral every 6 hours  montelukast 10 milliGRAM(s) Oral daily  sodium chloride 0.9%. 1000 milliLiter(s) (75 mL/Hr) IV Continuous <Continuous>  tamsulosin 0.8 milliGRAM(s) Oral at bedtime  warfarin 2 milliGRAM(s) Oral once    MEDICATIONS  (PRN):  acetaminophen   Tablet .. 650 milliGRAM(s) Oral every 6 hours PRN Mild Pain (1 - 3)  ALBUTerol/ipratropium for Nebulization 3 milliLiter(s) Nebulizer every 6 hours PRN Shortness of Breath and/or Wheezing  dextrose 40% Gel 15 Gram(s) Oral once PRN Blood Glucose LESS THAN 70 milliGRAM(s)/deciliter  diphenhydrAMINE 25 milliGRAM(s) Oral at bedtime PRN Insomina  glucagon  Injectable 1 milliGRAM(s) IntraMuscular once PRN Glucose LESS THAN 70 milligrams/deciliter  traMADol 50 milliGRAM(s) Oral two times a day PRN Severe Pain (7 - 10)      Vital Signs Last 24 Hrs  T(C): 36.7 (26 May 2019 17:57), Max: 37 (25 May 2019 21:07)  T(F): 98 (26 May 2019 17:57), Max: 98.6 (25 May 2019 21:07)  HR: 86 (26 May 2019 17:57) (81 - 133)  BP: 125/74 (26 May 2019 17:57) (103/73 - 143/93)  BP(mean): 99 (25 May 2019 21:07) (99 - 99)  RR: 16 (26 May 2019 17:57) (16 - 17)  SpO2: 95% (26 May 2019 17:57) (93% - 96%)  CAPILLARY BLOOD GLUCOSE      POCT Blood Glucose.: 217 mg/dL (26 May 2019 17:25)  POCT Blood Glucose.: 250 mg/dL (26 May 2019 12:28)  POCT Blood Glucose.: 148 mg/dL (26 May 2019 07:44)  POCT Blood Glucose.: 155 mg/dL (25 May 2019 23:40)    I&O's Summary    25 May 2019 07:  -  26 May 2019 07:00  --------------------------------------------------------  IN: 0 mL / OUT: 175 mL / NET: -175 mL    26 May 2019 07:01  -  26 May 2019 20:08  --------------------------------------------------------  IN: 1200 mL / OUT: 550 mL / NET: 650 mL          PHYSICAL EXAM:   GENERAL: NAD, well-developed  HEAD:  Atraumatic, Normocephalic  EYES: Conjunctiva and sclera clear  NECK: Supple  CHEST/LUNG: Clear, except mild right lung base crackles.   HEART: S1S2 normal. Irregular rate and rhythm; No murmurs, rubs, or gallops  ABDOMEN: Soft, Nontender, Nondistended; Bowel sounds present  EXTREMITIES: No LE edema  PSYCH/Neuro: AAOx3. Non-focal.   SKIN: No rashes or lesions      LABS:                        14.7   12.47 )-----------( 156      ( 26 May 2019 09:33 )             45.7     05-26    138  |  101  |  28<H>  ----------------------------<  142<H>  4.4   |  21<L>  |  2.21<H>    Ca    9.3      26 May 2019 06:01  Phos  3.0       Mg     2.1         TPro  7.4  /  Alb  4.0  /  TBili  0.5  /  DBili  x   /  AST  20  /  ALT  22  /  AlkPhos  68      PT/INR - ( 26 May 2019 09:22 )   PT: 32.8 sec;   INR: 2.76 ratio         PTT - ( 25 May 2019 13:20 )  PTT:39.3 sec  CARDIAC MARKERS ( 26 May 2019 14:26 )  x     / x     / 103 U/L / x     / 3.1 ng/mL      Urinalysis Basic - ( 25 May 2019 17:14 )    Color: Yellow / Appearance: Clear / S.018 / pH: x  Gluc: x / Ketone: Negative  / Bili: Negative / Urobili: Negative   Blood: x / Protein: 100 / Nitrite: Negative   Leuk Esterase: Negative / RBC: 3 /hpf / WBC 6 /HPF   Sq Epi: x / Non Sq Epi: 1 /hpf / Bacteria: Negative      Telemetry reviewed by me: Aflutter 's-130's. But later converted to Sinus rate 80's.     RADIOLOGY & ADDITIONAL TESTS:    Imaging Personally Reviewed:   Consultant(s) Notes Reviewed: EPS    Care Discussed with Consultants/Other Providers: Patient is a 78y old  Male who presents with a chief complaint of s/p fall (26 May 2019 10:48)        SUBJECTIVE / OVERNIGHT EVENTS: Patient denies any CP or SOB or feeling any palpitations. He reports feeling a little achy and some cough.       MEDICATIONS  (STANDING):  buDESOnide 160 MICROgram(s)/formoterol 4.5 MICROgram(s) Inhaler 2 Puff(s) Inhalation two times a day  dextrose 5%. 1000 milliLiter(s) (50 mL/Hr) IV Continuous <Continuous>  dextrose 50% Injectable 12.5 Gram(s) IV Push once  dextrose 50% Injectable 25 Gram(s) IV Push once  dextrose 50% Injectable 25 Gram(s) IV Push once  finasteride 5 milliGRAM(s) Oral daily  insulin lispro (HumaLOG) corrective regimen sliding scale   SubCutaneous three times a day before meals  insulin lispro (HumaLOG) corrective regimen sliding scale   SubCutaneous at bedtime  melatonin 5 milliGRAM(s) Oral at bedtime  metoprolol tartrate 50 milliGRAM(s) Oral every 6 hours  montelukast 10 milliGRAM(s) Oral daily  sodium chloride 0.9%. 1000 milliLiter(s) (75 mL/Hr) IV Continuous <Continuous>  tamsulosin 0.8 milliGRAM(s) Oral at bedtime  warfarin 2 milliGRAM(s) Oral once    MEDICATIONS  (PRN):  acetaminophen   Tablet .. 650 milliGRAM(s) Oral every 6 hours PRN Mild Pain (1 - 3)  ALBUTerol/ipratropium for Nebulization 3 milliLiter(s) Nebulizer every 6 hours PRN Shortness of Breath and/or Wheezing  dextrose 40% Gel 15 Gram(s) Oral once PRN Blood Glucose LESS THAN 70 milliGRAM(s)/deciliter  diphenhydrAMINE 25 milliGRAM(s) Oral at bedtime PRN Insomina  glucagon  Injectable 1 milliGRAM(s) IntraMuscular once PRN Glucose LESS THAN 70 milligrams/deciliter  traMADol 50 milliGRAM(s) Oral two times a day PRN Severe Pain (7 - 10)      Vital Signs Last 24 Hrs  T(C): 36.7 (26 May 2019 17:57), Max: 37 (25 May 2019 21:07)  T(F): 98 (26 May 2019 17:57), Max: 98.6 (25 May 2019 21:07)  HR: 86 (26 May 2019 17:57) (81 - 133)  BP: 125/74 (26 May 2019 17:57) (103/73 - 143/93)  BP(mean): 99 (25 May 2019 21:07) (99 - 99)  RR: 16 (26 May 2019 17:57) (16 - 17)  SpO2: 95% (26 May 2019 17:57) (93% - 96%)  CAPILLARY BLOOD GLUCOSE      POCT Blood Glucose.: 217 mg/dL (26 May 2019 17:25)  POCT Blood Glucose.: 250 mg/dL (26 May 2019 12:28)  POCT Blood Glucose.: 148 mg/dL (26 May 2019 07:44)  POCT Blood Glucose.: 155 mg/dL (25 May 2019 23:40)    I&O's Summary    25 May 2019 07:  -  26 May 2019 07:00  --------------------------------------------------------  IN: 0 mL / OUT: 175 mL / NET: -175 mL    26 May 2019 07:01  -  26 May 2019 20:08  --------------------------------------------------------  IN: 1200 mL / OUT: 550 mL / NET: 650 mL          PHYSICAL EXAM:   GENERAL: NAD, well-developed  HEAD:  Mild right face scratches from fall.   EYES: Conjunctiva and sclera clear  NECK: Supple  CHEST/LUNG: Clear, except mild right lung base crackles.   HEART: S1S2 normal. Irregular rate and rhythm; No murmurs, rubs, or gallops  ABDOMEN: Soft, Nontender, Nondistended; Bowel sounds present  EXTREMITIES: No LE edema  PSYCH/Neuro: AAOx3. Non-focal.   SKIN: No rashes or lesions      LABS:                        14.7   12.47 )-----------( 156      ( 26 May 2019 09:33 )             45.7     05-    138  |  101  |  28<H>  ----------------------------<  142<H>  4.4   |  21<L>  |  2.21<H>    Ca    9.3      26 May 2019 06:01  Phos  3.0       Mg     2.1         TPro  7.4  /  Alb  4.0  /  TBili  0.5  /  DBili  x   /  AST  20  /  ALT  22  /  AlkPhos  68      PT/INR - ( 26 May 2019 09:22 )   PT: 32.8 sec;   INR: 2.76 ratio         PTT - ( 25 May 2019 13:20 )  PTT:39.3 sec  CARDIAC MARKERS ( 26 May 2019 14:26 )  x     / x     / 103 U/L / x     / 3.1 ng/mL      Urinalysis Basic - ( 25 May 2019 17:14 )    Color: Yellow / Appearance: Clear / S.018 / pH: x  Gluc: x / Ketone: Negative  / Bili: Negative / Urobili: Negative   Blood: x / Protein: 100 / Nitrite: Negative   Leuk Esterase: Negative / RBC: 3 /hpf / WBC 6 /HPF   Sq Epi: x / Non Sq Epi: 1 /hpf / Bacteria: Negative      Telemetry reviewed by me: Aflutter 's-130's. But later converted to Sinus rate 80's.     RADIOLOGY & ADDITIONAL TESTS:    Imaging Personally Reviewed:   Consultant(s) Notes Reviewed: EPS    Care Discussed with Consultants/Other Providers:

## 2019-05-27 LAB
ANION GAP SERPL CALC-SCNC: 15 MMOL/L — SIGNIFICANT CHANGE UP (ref 5–17)
BUN SERPL-MCNC: 32 MG/DL — HIGH (ref 7–23)
CALCIUM SERPL-MCNC: 9.8 MG/DL — SIGNIFICANT CHANGE UP (ref 8.4–10.5)
CHLORIDE SERPL-SCNC: 100 MMOL/L — SIGNIFICANT CHANGE UP (ref 96–108)
CO2 SERPL-SCNC: 23 MMOL/L — SIGNIFICANT CHANGE UP (ref 22–31)
CREAT SERPL-MCNC: 2.15 MG/DL — HIGH (ref 0.5–1.3)
GLUCOSE BLDC GLUCOMTR-MCNC: 179 MG/DL — HIGH (ref 70–99)
GLUCOSE BLDC GLUCOMTR-MCNC: 188 MG/DL — HIGH (ref 70–99)
GLUCOSE BLDC GLUCOMTR-MCNC: 196 MG/DL — HIGH (ref 70–99)
GLUCOSE BLDC GLUCOMTR-MCNC: 199 MG/DL — HIGH (ref 70–99)
GLUCOSE SERPL-MCNC: 187 MG/DL — HIGH (ref 70–99)
HCT VFR BLD CALC: 38.5 % — LOW (ref 39–50)
HGB BLD-MCNC: 12.8 G/DL — LOW (ref 13–17)
INR BLD: 2.92 RATIO — HIGH (ref 0.88–1.16)
MCHC RBC-ENTMCNC: 33.2 GM/DL — SIGNIFICANT CHANGE UP (ref 32–36)
MCHC RBC-ENTMCNC: 33.7 PG — SIGNIFICANT CHANGE UP (ref 27–34)
MCV RBC AUTO: 101.3 FL — HIGH (ref 80–100)
PLATELET # BLD AUTO: 156 K/UL — SIGNIFICANT CHANGE UP (ref 150–400)
POTASSIUM SERPL-MCNC: 4.6 MMOL/L — SIGNIFICANT CHANGE UP (ref 3.5–5.3)
POTASSIUM SERPL-SCNC: 4.6 MMOL/L — SIGNIFICANT CHANGE UP (ref 3.5–5.3)
PROTHROM AB SERPL-ACNC: 34.4 SEC — HIGH (ref 10–12.9)
RBC # BLD: 3.8 M/UL — LOW (ref 4.2–5.8)
RBC # FLD: 12.6 % — SIGNIFICANT CHANGE UP (ref 10.3–14.5)
SODIUM SERPL-SCNC: 138 MMOL/L — SIGNIFICANT CHANGE UP (ref 135–145)
WBC # BLD: 13.61 K/UL — HIGH (ref 3.8–10.5)
WBC # FLD AUTO: 13.61 K/UL — HIGH (ref 3.8–10.5)

## 2019-05-27 PROCEDURE — 99232 SBSQ HOSP IP/OBS MODERATE 35: CPT

## 2019-05-27 PROCEDURE — 93010 ELECTROCARDIOGRAM REPORT: CPT

## 2019-05-27 PROCEDURE — 99233 SBSQ HOSP IP/OBS HIGH 50: CPT

## 2019-05-27 PROCEDURE — 73000 X-RAY EXAM OF COLLAR BONE: CPT | Mod: 26,RT

## 2019-05-27 RX ORDER — WARFARIN SODIUM 2.5 MG/1
2 TABLET ORAL ONCE
Refills: 0 | Status: COMPLETED | OUTPATIENT
Start: 2019-05-27 | End: 2019-05-27

## 2019-05-27 RX ORDER — LIDOCAINE 4 G/100G
1 CREAM TOPICAL DAILY
Refills: 0 | Status: DISCONTINUED | OUTPATIENT
Start: 2019-05-27 | End: 2019-05-28

## 2019-05-27 RX ORDER — METOPROLOL TARTRATE 50 MG
50 TABLET ORAL EVERY 6 HOURS
Refills: 0 | Status: COMPLETED | OUTPATIENT
Start: 2019-05-27 | End: 2019-05-27

## 2019-05-27 RX ORDER — METOPROLOL TARTRATE 50 MG
200 TABLET ORAL DAILY
Refills: 0 | Status: DISCONTINUED | OUTPATIENT
Start: 2019-05-28 | End: 2019-05-28

## 2019-05-27 RX ADMIN — TRAMADOL HYDROCHLORIDE 50 MILLIGRAM(S): 50 TABLET ORAL at 23:33

## 2019-05-27 RX ADMIN — WARFARIN SODIUM 2 MILLIGRAM(S): 2.5 TABLET ORAL at 23:01

## 2019-05-27 RX ADMIN — BUDESONIDE AND FORMOTEROL FUMARATE DIHYDRATE 2 PUFF(S): 160; 4.5 AEROSOL RESPIRATORY (INHALATION) at 05:04

## 2019-05-27 RX ADMIN — TRAMADOL HYDROCHLORIDE 50 MILLIGRAM(S): 50 TABLET ORAL at 09:37

## 2019-05-27 RX ADMIN — BUDESONIDE AND FORMOTEROL FUMARATE DIHYDRATE 2 PUFF(S): 160; 4.5 AEROSOL RESPIRATORY (INHALATION) at 17:26

## 2019-05-27 RX ADMIN — TAMSULOSIN HYDROCHLORIDE 0.8 MILLIGRAM(S): 0.4 CAPSULE ORAL at 23:01

## 2019-05-27 RX ADMIN — Medication 50 MILLIGRAM(S): at 11:44

## 2019-05-27 RX ADMIN — Medication 50 MILLIGRAM(S): at 17:25

## 2019-05-27 RX ADMIN — Medication 1: at 11:43

## 2019-05-27 RX ADMIN — MONTELUKAST 10 MILLIGRAM(S): 4 TABLET, CHEWABLE ORAL at 11:44

## 2019-05-27 RX ADMIN — Medication 50 MILLIGRAM(S): at 05:05

## 2019-05-27 RX ADMIN — Medication 50 MILLIGRAM(S): at 23:01

## 2019-05-27 RX ADMIN — LIDOCAINE 1 PATCH: 4 CREAM TOPICAL at 19:29

## 2019-05-27 RX ADMIN — Medication 1: at 07:47

## 2019-05-27 RX ADMIN — TRAMADOL HYDROCHLORIDE 50 MILLIGRAM(S): 50 TABLET ORAL at 10:15

## 2019-05-27 RX ADMIN — FINASTERIDE 5 MILLIGRAM(S): 5 TABLET, FILM COATED ORAL at 11:44

## 2019-05-27 RX ADMIN — Medication 1: at 17:25

## 2019-05-27 RX ADMIN — LIDOCAINE 1 PATCH: 4 CREAM TOPICAL at 17:25

## 2019-05-27 RX ADMIN — TRAMADOL HYDROCHLORIDE 50 MILLIGRAM(S): 50 TABLET ORAL at 23:03

## 2019-05-27 RX ADMIN — Medication 5 MILLIGRAM(S): at 23:01

## 2019-05-27 NOTE — PROGRESS NOTE ADULT - ATTENDING COMMENTS
Oscar Swain MD  Division of [facility]  Cell: [phone]  Pager: [phone]  Office: [phone]

## 2019-05-27 NOTE — PHYSICAL THERAPY INITIAL EVALUATION ADULT - PLANNED THERAPY INTERVENTIONS, PT EVAL
GOAL: Pt will negotiate 10 steps with 1 HR and step to pattern independently in 4 weeks./gait training/balance training

## 2019-05-27 NOTE — PROGRESS NOTE ADULT - PROBLEM SELECTOR PLAN 3
-JUAN A on CKD appears likely secondary to dehydration. -Improving s/p IVF's.   -Baseline Cr appears to ~2.6 per HIE [id number]  -Hydronephrosis partially visualized on thoracic CT and with hx of BPH  -Renal US to eval the hydronephrosis. Can bladder scan to monitor for any retention.

## 2019-05-27 NOTE — PHYSICAL THERAPY INITIAL EVALUATION ADULT - PERTINENT HX OF CURRENT PROBLEM, REHAB EVAL
79 yo M p/w back pain s/p  backward fall off one step 4 days ago. No head injury or LOC at this time. Reports upper back pain after fall, f/u with PMD and had negative Xrays of chest and spine. Pt and wife at bedside reports pt then fell out of bed 2 days ago. Pt does not recall this. + abrasion to face. Found to be in AFlutter with RVR likely due to dehydration from decreased PO intake since fall. XRay Chest 5/25: Clear lungs. CT Head 5/25: Neg. CT T spine 5/25: Neg.

## 2019-05-27 NOTE — PROGRESS NOTE ADULT - SUBJECTIVE AND OBJECTIVE BOX
Patient is a 78y old  Male who presents with a chief complaint of s/p fall (27 May 2019 15:09)        SUBJECTIVE / OVERNIGHT EVENTS: Patient reports some right clavicle/shoulder pain, which he attributes to recent fall. He reports some other aches and pains in his neck. He denies CP or SOB.       MEDICATIONS  (STANDING):  buDESOnide 160 MICROgram(s)/formoterol 4.5 MICROgram(s) Inhaler 2 Puff(s) Inhalation two times a day  dextrose 5%. 1000 milliLiter(s) (50 mL/Hr) IV Continuous <Continuous>  dextrose 50% Injectable 12.5 Gram(s) IV Push once  dextrose 50% Injectable 25 Gram(s) IV Push once  dextrose 50% Injectable 25 Gram(s) IV Push once  finasteride 5 milliGRAM(s) Oral daily  insulin lispro (HumaLOG) corrective regimen sliding scale   SubCutaneous three times a day before meals  insulin lispro (HumaLOG) corrective regimen sliding scale   SubCutaneous at bedtime  lidocaine   Patch 1 Patch Transdermal daily  melatonin 5 milliGRAM(s) Oral at bedtime  metoprolol succinate  milliGRAM(s) Oral daily  montelukast 10 milliGRAM(s) Oral daily  tamsulosin 0.8 milliGRAM(s) Oral at bedtime    MEDICATIONS  (PRN):  acetaminophen   Tablet .. 650 milliGRAM(s) Oral every 6 hours PRN Mild Pain (1 - 3)  ALBUTerol/ipratropium for Nebulization 3 milliLiter(s) Nebulizer every 6 hours PRN Shortness of Breath and/or Wheezing  dextrose 40% Gel 15 Gram(s) Oral once PRN Blood Glucose LESS THAN 70 milliGRAM(s)/deciliter  diphenhydrAMINE 25 milliGRAM(s) Oral at bedtime PRN Insomina  glucagon  Injectable 1 milliGRAM(s) IntraMuscular once PRN Glucose LESS THAN 70 milligrams/deciliter  traMADol 50 milliGRAM(s) Oral two times a day PRN Severe Pain (7 - 10)      Vital Signs Last 24 Hrs  T(C): 37.6 (27 May 2019 20:43), Max: 37.6 (27 May 2019 20:43)  T(F): 99.7 (27 May 2019 20:43), Max: 99.7 (27 May 2019 20:43)  HR: 84 (27 May 2019 23:00) (65 - 84)  BP: 152/79 (27 May 2019 23:00) (123/75 - 152/81)  BP(mean): --  RR: 18 (27 May 2019 20:43) (16 - 18)  SpO2: 96% (27 May 2019 20:43) (94% - 97%)  CAPILLARY BLOOD GLUCOSE      POCT Blood Glucose.: 196 mg/dL (27 May 2019 21:37)  POCT Blood Glucose.: 199 mg/dL (27 May 2019 17:03)  POCT Blood Glucose.: 179 mg/dL (27 May 2019 11:33)  POCT Blood Glucose.: 188 mg/dL (27 May 2019 07:24)    I&O's Summary    26 May 2019 07:01  -  27 May 2019 07:00  --------------------------------------------------------  IN: 1500 mL / OUT: 550 mL / NET: 950 mL    27 May 2019 07:01  -  28 May 2019 00:27  --------------------------------------------------------  IN: 1140 mL / OUT: 0 mL / NET: 1140 mL          PHYSICAL EXAM:   GENERAL: NAD, well-developed  HEAD:  Atraumatic, Normocephalic  EYES: Conjunctiva and sclera clear  NECK: Supple  CHEST/LUNG: Mild crackles in lung bases (R>L).   HEART: S1S2 normal. Regular rate and rhythm; No murmurs, rubs, or gallops  ABDOMEN: Soft, Nontender, Nondistended; Bowel sounds present  EXTREMITIES: No LE edema. Normal ROM of right arm at shoulder, but some reported discomfort.   PSYCH/Neuro: AAOx3. Mild ?tremor.   SKIN: No rashes or lesions      LABS:                        12.8   13.61 )-----------( 156      ( 27 May 2019 12:46 )             38.5     05-27    138  |  100  |  32<H>  ----------------------------<  187<H>  4.6   |  23  |  2.15<H>    Ca    9.8      27 May 2019 10:10  Phos  3.0     05-26  Mg     2.1     05-26      PT/INR - ( 27 May 2019 21:06 )   PT: 34.4 sec;   INR: 2.92 ratio           CARDIAC MARKERS ( 26 May 2019 14:26 )  x     / x     / 103 U/L / x     / 3.1 ng/mL      < from: Xray Clavicle, Right (05.27.19 @ 15:24) >  IMPRESSION:   Nonspecific small calcific/ossific densities project adjacent to the   inferior aspect of the mid to distal clavicular shaft between the   clavicle and the coracoid, question related to prior ligamentous injury.   No acute displaced fracture is seen in the right clavicular shaft.   Rounded calcification projecting adjacent to the humeral head, possible   calcific tendinopathy. The AC joint is not widened. The coracoclavicular   distance is not widened.    < end of copied text >      Telemetry reviewed by me: Sinus 60-80's. PVC's. Run of Aflutter for 6 seconds up to 140's. Some PAT.     RADIOLOGY & ADDITIONAL TESTS:    Imaging Personally Reviewed: Clavicle X-ray report.   Consultant(s) Notes Reviewed:  EP  Care Discussed with Consultants/Other Providers:

## 2019-05-27 NOTE — PROGRESS NOTE ADULT - ASSESSMENT
78M h/o AF/AFlutter, ablation in 2012, , CKD, BPH, early Parkinson's, colon cancer s/p colectomy, now here with atypical atrial flutter with rapid ventricular response, but converted to sinus.  Recommend changing metoprolol to succinate 200 mg and obtaining an echocardiogram. Will continue warfarin for now.  Patient may be discharged to follow-up with electrophysiologist as outpatient if ok with primary team. 78M h/o AF/AFlutter, ablation in 2012, , CKD, BPH, early Parkinson's, colon cancer s/p colectomy, now here with atypical atrial flutter with rapid ventricular response, but converted to sinus.  Recommend changing metoprolol to succinate 200 mg. Will continue warfarin for now.  Patient may be discharged to follow-up with electrophysiologist as outpatient if ok with primary team.

## 2019-05-27 NOTE — PROGRESS NOTE ADULT - PROBLEM SELECTOR PLAN 5
-Description of falls sounds mechanical, likely in the setting of Parkinson's. ?Also possibly precipitated by uncontrolled Aflutter. ?Possibly due to orthostatic changes.   -C/w telemetry.   -Echo pending.   -Orthostatics were positive, but not symptomatic. Will monitor.  -Tramadol PRN for pains for now, patient says he takes it at home, however not found in iSTOP. If becomes sedated, may need to stop this as this could also contribute to falls. Patient denies that this has been a problem in the past. -Says he has an old bottle at home, which he uses sparingly.   -Fall precautions.  -Right clavicular X-ray negative for fracture. -Lidocaine patch for pain.   -Will check Vitamin D level in am.

## 2019-05-27 NOTE — PHYSICAL THERAPY INITIAL EVALUATION ADULT - ADDITIONAL COMMENTS
pt lives in private home with wife, 2 steps to enter +HR. First floor set up. Prior to admission, pt was I with all functional mobility and ADLs without AD. 2 recent falls (one off a step ladder, one in the middle of the night). +

## 2019-05-27 NOTE — PROGRESS NOTE ADULT - PROBLEM SELECTOR PLAN 1
-On admission HR in the 120s-130s despite 2L IV and multiple doses of both IV and PO metoprolol tartrate as well as diltiazem.  -However, HR now improved and patient in Sinus rhythm after giving some more IVF's and EPS increasing metoprolol to 50mg PO Q6H.   -Will convert to metoprolol XL 200mg daily from tomorrow.   -Had short run of Afib again today and has some TWI's in V1-V6, therefore will keep patient to get echocardiogram tomorrow and observe.  -Aflutter with RVR may have been 2/2 to dehydration. Trops negative.   -EPS recs appreciated; were ok with DC in the morning today.    -C/w coumadin 2mg tonight. -Patient alternates 2mg/2.5mg at home. -F/u INR tomorrow.   -C/w telemetry. TTE ordered.  -TSH normal.

## 2019-05-27 NOTE — PROGRESS NOTE ADULT - SUBJECTIVE AND OBJECTIVE BOX
Subjective  Pain from fall, otherwise asymptomatic.  No dyspnea, chest pain, orthopnea, PND, lightheadedness, syncope.     Past Medical History    PAST MEDICAL & SURGICAL HISTORY:  Hyperlipidemia  Colon cancer  Diabetes mellitus: Type 2  Basal Cell Cancer: s/p surgery left ear  Renal Insufficiency  Renal Cyst: left renal  Aneurysm: Abdominal aortic aneurysm  BPH (Benign Prostatic Hyperplasia)  COPD (Chronic Obstructive Pulmonary Disease)  Cardiac Arrhythmia: atrial fibrillation  H/O colectomy: open right colectomy  History of bone marrow biopsy  H/O colonoscopy with polypectomy:  - + for malignancy  S/P tonsillectomy  S/P aortic aneurysm repair:   Basal Cell Cancer: excision left ear  S/P Lumbar Discectomy: in       MEDICATIONS:  metoprolol tartrate 50 milliGRAM(s) Oral every 6 hours  tamsulosin 0.8 milliGRAM(s) Oral at bedtime      ALBUTerol/ipratropium for Nebulization 3 milliLiter(s) Nebulizer every 6 hours PRN  buDESOnide 160 MICROgram(s)/formoterol 4.5 MICROgram(s) Inhaler 2 Puff(s) Inhalation two times a day  montelukast 10 milliGRAM(s) Oral daily    acetaminophen   Tablet .. 650 milliGRAM(s) Oral every 6 hours PRN  diphenhydrAMINE 25 milliGRAM(s) Oral at bedtime PRN  melatonin 5 milliGRAM(s) Oral at bedtime  traMADol 50 milliGRAM(s) Oral two times a day PRN      dextrose 40% Gel 15 Gram(s) Oral once PRN  dextrose 50% Injectable 12.5 Gram(s) IV Push once  dextrose 50% Injectable 25 Gram(s) IV Push once  dextrose 50% Injectable 25 Gram(s) IV Push once  finasteride 5 milliGRAM(s) Oral daily  glucagon  Injectable 1 milliGRAM(s) IntraMuscular once PRN  insulin lispro (HumaLOG) corrective regimen sliding scale   SubCutaneous three times a day before meals  insulin lispro (HumaLOG) corrective regimen sliding scale   SubCutaneous at bedtime    dextrose 5%. 1000 milliLiter(s) IV Continuous <Continuous>  sodium chloride 0.9%. 1000 milliLiter(s) IV Continuous <Continuous>        Allergies    amoxicillin (Pruritus; Rash)  nitrofurantoin (Unknown)  penicillins (Pruritus; Rash)    Intolerances    clindamycin (Stomach Upset)      FAMILY HISTORY:  Family history of colon cancer      SOCIAL HISTORY    Marital Status:   Occupation:   Lives with:     SUBSTANCE USE  Tobacco Usage:  ( ) never smoked   ( ) former smoker  ( ) current smoker; Packs per day:   Alcohol Usage: ( ) none  ( ) occasional ( ) 2-3 times a week ( ) daily; Last drink:   Recreational drugs ( ) None    REVIEW OF SYSTEMS:    CONSTITUTIONAL: No fevers, No chills, No fatigue, No weight gain  EYES: No vision changes   ENT: No congestion, No ear pain, No sore throat.  NECK: No pain, No stiffness  RESPIRATORY: No shortness of breath, No cough, No wheezing, No hemoptysis  CARDIOVASCULAR: No chest pain. No palpitations, No ROBISON, No orthopnea, No paroxysmal nocturnal dyspnea, No pleuritic pain  GASTROINTESTINAL: No abdominal pain, No nausea, No vomiting, No hematemesis, No diarrhea No constipation. No melena  GENITOURINARY: No dysuria, No frequency, No incontinence, No hematuria  NEUROLOGICAL: No dizziness, No lightheadedness, No syncope, No LOC, No headache, No numbness or weakness  EXTREMITIES: No Edema, No joint pain, No joint swelling.  PSYCHIATRIC: No anxiety, No depression  SKIN: No diaphoresis. No itching, No rashes, No pressure ulcers    All other review of systems is negative unless indicated above.    VITAL SIGNS  T(C): 36.6 (19 @ 05:00), Max: 36.9 (19 @ 13:04)  HR: 65 (19 @ 05:00) (65 - 133)  BP: 134/81 (19 @ 05:00) (103/73 - 149/81)  RR: 17 (19 @ 05:00) (16 - 17)  SpO2: 94% (19 @ 05:00) (94% - 96%)  Wt(kg): --    PHYSICAL EXAM:    Appearance: NAD, no distress  HEENT:   Normal oral mucosa, PERRL, EOMI  Cardiovascular: Regular rate and rhythm, Normal S1 S2, No JVD, No murmurs, No edema  Respiratory: Lungs clear to auscultation. No rales, No rhonchi, No wheezing. No tenderness to palpation  Gastrointestinal:  Soft, Non-tender, + BS  Neurologic: Non-focal, A&Ox3  Skin: Warm and dry, No rashes, No ecchymoses, No cyanosis  Extremities: No clubbing, cyanosis or edema  Vascular: Peripheral pulses palpable 2+ bilaterally  Psychiatry: Mood & affect appropriate      	    		        I&O's Summary    26 May 2019 07:01  -  27 May 2019 07:00  --------------------------------------------------------  IN: 1500 mL / OUT: 550 mL / NET: 950 mL        LABORATORY VALUES	 	                          14.7   12.47 )-----------( 156      ( 26 May 2019 09:33 )             45.7           138  |  101  |  28<H>  ----------------------------<  142<H>  4.4   |  21<L>  |  2.21<H>      135  |  98  |  34<H>  ----------------------------<  300<H>  4.7   |  22  |  2.60<H>    Ca    9.3      26 May 2019 06:01  Ca    9.6      25 May 2019 13:20  Phos  3.0       Mg     2.1         TPro  7.4  /  Alb  4.0  /  TBili  0.5  /  DBili  x   /  AST  20  /  ALT  22  /  AlkPhos  68      LIVER FUNCTIONS - ( 25 May 2019 13:20 )  Alb: 4.0 g/dL / Pro: 7.4 g/dL / ALK PHOS: 68 U/L / ALT: 22 U/L / AST: 20 U/L / GGT: x           Prothrombin Time, Plasma: 32.8 sec ( @ 09:22)      CARDIAC MARKERS:  Creatine Kinase, Serum: 103 U/L ( @ 14:26)                    Hemoglobin A1C, Whole Blood: 8.4 % ( @ 09:33)    Thyroid Stimulating Hormone, Serum: 1.05 uIU/mL ( @ 13:50)      Urinalysis Basic - ( 25 May 2019 17:14 )    Color: Yellow / Appearance: Clear / S.018 / pH: x  Gluc: x / Ketone: Negative  / Bili: Negative / Urobili: Negative   Blood: x / Protein: 100 / Nitrite: Negative   Leuk Esterase: Negative / RBC: 3 /hpf / WBC 6 /HPF   Sq Epi: x / Non Sq Epi: 1 /hpf / Bacteria: Negative      CAPILLARY BLOOD GLUCOSE      POCT Blood Glucose.: 230 mg/dL (26 May 2019 21:39)          TELEMETRY: 	  Sinus rhythm 60-80; PAT up to 112  ECG:  	  RADIOLOGY:  OTHER: 	    PREVIOUS DIAGNOSTIC TESTING:    [ ] Echocardiogram:  [ ] Catheterization:  [ ] Stress Test: Subjective  Pain from fall, otherwise asymptomatic.  No dyspnea, chest pain, orthopnea, PND, lightheadedness, syncope.     Past Medical History    PAST MEDICAL & SURGICAL HISTORY:  Hyperlipidemia  Colon cancer  Diabetes mellitus: Type 2  Basal Cell Cancer: s/p surgery left ear  Renal Insufficiency  Renal Cyst: left renal  Aneurysm: Abdominal aortic aneurysm  BPH (Benign Prostatic Hyperplasia)  COPD (Chronic Obstructive Pulmonary Disease)  Cardiac Arrhythmia: atrial fibrillation  H/O colectomy: open right colectomy  History of bone marrow biopsy  H/O colonoscopy with polypectomy:  - + for malignancy  S/P tonsillectomy  S/P aortic aneurysm repair:   Basal Cell Cancer: excision left ear  S/P Lumbar Discectomy: in       MEDICATIONS:  metoprolol tartrate 50 milliGRAM(s) Oral every 6 hours  tamsulosin 0.8 milliGRAM(s) Oral at bedtime      ALBUTerol/ipratropium for Nebulization 3 milliLiter(s) Nebulizer every 6 hours PRN  buDESOnide 160 MICROgram(s)/formoterol 4.5 MICROgram(s) Inhaler 2 Puff(s) Inhalation two times a day  montelukast 10 milliGRAM(s) Oral daily    acetaminophen   Tablet .. 650 milliGRAM(s) Oral every 6 hours PRN  diphenhydrAMINE 25 milliGRAM(s) Oral at bedtime PRN  melatonin 5 milliGRAM(s) Oral at bedtime  traMADol 50 milliGRAM(s) Oral two times a day PRN      dextrose 40% Gel 15 Gram(s) Oral once PRN  dextrose 50% Injectable 12.5 Gram(s) IV Push once  dextrose 50% Injectable 25 Gram(s) IV Push once  dextrose 50% Injectable 25 Gram(s) IV Push once  finasteride 5 milliGRAM(s) Oral daily  glucagon  Injectable 1 milliGRAM(s) IntraMuscular once PRN  insulin lispro (HumaLOG) corrective regimen sliding scale   SubCutaneous three times a day before meals  insulin lispro (HumaLOG) corrective regimen sliding scale   SubCutaneous at bedtime    dextrose 5%. 1000 milliLiter(s) IV Continuous <Continuous>  sodium chloride 0.9%. 1000 milliLiter(s) IV Continuous <Continuous>        Allergies    amoxicillin (Pruritus; Rash)  nitrofurantoin (Unknown)  penicillins (Pruritus; Rash)    Intolerances    clindamycin (Stomach Upset)      FAMILY HISTORY:  Family history of colon cancer      SOCIAL HISTORY    Marital Status:   Occupation:   Lives with:     SUBSTANCE USE  Tobacco Usage:  ( ) never smoked   ( ) former smoker  ( ) current smoker; Packs per day:   Alcohol Usage: ( ) none  ( ) occasional ( ) 2-3 times a week ( ) daily; Last drink:   Recreational drugs ( ) None    REVIEW OF SYSTEMS:    CONSTITUTIONAL: No fevers, No chills, No fatigue, No weight gain  EYES: No vision changes   ENT: No congestion, No ear pain, No sore throat.  NECK: No pain, No stiffness  RESPIRATORY: No shortness of breath, No cough, No wheezing, No hemoptysis  CARDIOVASCULAR: No chest pain. No palpitations, No ROBISON, No orthopnea, No paroxysmal nocturnal dyspnea, No pleuritic pain  GASTROINTESTINAL: No abdominal pain, No nausea, No vomiting, No hematemesis, No diarrhea No constipation. No melena  GENITOURINARY: No dysuria, No frequency, No incontinence, No hematuria  NEUROLOGICAL: No dizziness, No lightheadedness, No syncope, No LOC, No headache, No numbness or weakness  EXTREMITIES: No Edema, No joint pain, No joint swelling.  PSYCHIATRIC: No anxiety, No depression  SKIN: No diaphoresis. No itching, No rashes, No pressure ulcers    All other review of systems is negative unless indicated above.    VITAL SIGNS  T(C): 36.6 (19 @ 05:00), Max: 36.9 (19 @ 13:04)  HR: 65 (19 @ 05:00) (65 - 133)  BP: 134/81 (19 @ 05:00) (103/73 - 149/81)  RR: 17 (19 @ 05:00) (16 - 17)  SpO2: 94% (19 @ 05:00) (94% - 96%)  Wt(kg): --    PHYSICAL EXAM:    Appearance: NAD, no distress  HEENT:   Normal oral mucosa, PERRL, EOMI  Cardiovascular: Regular rate and rhythm, Normal S1 S2, No JVD, No murmurs, No edema  Respiratory: Lungs clear to auscultation. No rales, No rhonchi, No wheezing. No tenderness to palpation  Gastrointestinal:  Soft, Non-tender, + BS  Neurologic: Non-focal, A&Ox3  Skin: Warm and dry, No rashes, No ecchymoses, No cyanosis  Extremities: No clubbing, cyanosis or edema  Vascular: Peripheral pulses palpable 2+ bilaterally  Psychiatry: Mood & affect appropriate      	    		        I&O's Summary    26 May 2019 07:01  -  27 May 2019 07:00  --------------------------------------------------------  IN: 1500 mL / OUT: 550 mL / NET: 950 mL        LABORATORY VALUES	 	                          14.7   12.47 )-----------( 156      ( 26 May 2019 09:33 )             45.7           138  |  101  |  28<H>  ----------------------------<  142<H>  4.4   |  21<L>  |  2.21<H>      135  |  98  |  34<H>  ----------------------------<  300<H>  4.7   |  22  |  2.60<H>    Ca    9.3      26 May 2019 06:01  Ca    9.6      25 May 2019 13:20  Phos  3.0       Mg     2.1         TPro  7.4  /  Alb  4.0  /  TBili  0.5  /  DBili  x   /  AST  20  /  ALT  22  /  AlkPhos  68      LIVER FUNCTIONS - ( 25 May 2019 13:20 )  Alb: 4.0 g/dL / Pro: 7.4 g/dL / ALK PHOS: 68 U/L / ALT: 22 U/L / AST: 20 U/L / GGT: x           Prothrombin Time, Plasma: 32.8 sec ( @ 09:22)      CARDIAC MARKERS:  Creatine Kinase, Serum: 103 U/L ( @ 14:26)                    Hemoglobin A1C, Whole Blood: 8.4 % ( @ 09:33)    Thyroid Stimulating Hormone, Serum: 1.05 uIU/mL ( @ 13:50)      Urinalysis Basic - ( 25 May 2019 17:14 )    Color: Yellow / Appearance: Clear / S.018 / pH: x  Gluc: x / Ketone: Negative  / Bili: Negative / Urobili: Negative   Blood: x / Protein: 100 / Nitrite: Negative   Leuk Esterase: Negative / RBC: 3 /hpf / WBC 6 /HPF   Sq Epi: x / Non Sq Epi: 1 /hpf / Bacteria: Negative      CAPILLARY BLOOD GLUCOSE      POCT Blood Glucose.: 230 mg/dL (26 May 2019 21:39)          TELEMETRY: 	  Sinus rhythm 60-80; PAT up to 112  ECG:  	  RADIOLOGY:  OTHER: 	    PREVIOUS DIAGNOSTIC TESTING:    [ ] Echocardiogram: < from: US TTE 2D F/U, Limited w/o Contrast (ED) (19 @ 16:08) >  Procedure was performed in the Emergency Department by a credentialed   Emergency Medicine Attending Physician    EXAM:  ER TTE LIMITED      ORDER COMMENTS:      PROCEDURE DATE:  2019    FOCUSED ED ULTRASOUND REPORT          INTERPRETATION:  A focused transthoracic cardiac ultrasound examination   was performed.   No significant pericardial effusion was present.  No global wall motion abnormality was identified  Limited secondary to significant tachycardia  No pleural effusions seen that lung bases    IMPRESSION:   No significant Pericardial Effusion.                GEORGINA SOLOMON   This document has been electronically signed. May 25 2019  4:08PM    < end of copied text >    [ ] Catheterization:  [ ] Stress Test:

## 2019-05-28 ENCOUNTER — TRANSCRIPTION ENCOUNTER (OUTPATIENT)
Age: 79
End: 2019-05-28

## 2019-05-28 VITALS — WEIGHT: 151.9 LBS

## 2019-05-28 LAB
ANION GAP SERPL CALC-SCNC: 15 MMOL/L — SIGNIFICANT CHANGE UP (ref 5–17)
BUN SERPL-MCNC: 35 MG/DL — HIGH (ref 7–23)
CALCIUM SERPL-MCNC: 9.3 MG/DL — SIGNIFICANT CHANGE UP (ref 8.4–10.5)
CHLORIDE SERPL-SCNC: 98 MMOL/L — SIGNIFICANT CHANGE UP (ref 96–108)
CO2 SERPL-SCNC: 20 MMOL/L — LOW (ref 22–31)
CREAT SERPL-MCNC: 2.18 MG/DL — HIGH (ref 0.5–1.3)
GLUCOSE BLDC GLUCOMTR-MCNC: 203 MG/DL — HIGH (ref 70–99)
GLUCOSE BLDC GLUCOMTR-MCNC: 214 MG/DL — HIGH (ref 70–99)
GLUCOSE SERPL-MCNC: 212 MG/DL — HIGH (ref 70–99)
HCT VFR BLD CALC: 37.2 % — LOW (ref 39–50)
HGB BLD-MCNC: 12.3 G/DL — LOW (ref 13–17)
INR BLD: 2.87 RATIO — HIGH (ref 0.88–1.16)
MCHC RBC-ENTMCNC: 33.1 GM/DL — SIGNIFICANT CHANGE UP (ref 32–36)
MCHC RBC-ENTMCNC: 33.5 PG — SIGNIFICANT CHANGE UP (ref 27–34)
MCV RBC AUTO: 101.4 FL — HIGH (ref 80–100)
PLATELET # BLD AUTO: 146 K/UL — LOW (ref 150–400)
POTASSIUM SERPL-MCNC: 4.8 MMOL/L — SIGNIFICANT CHANGE UP (ref 3.5–5.3)
POTASSIUM SERPL-SCNC: 4.8 MMOL/L — SIGNIFICANT CHANGE UP (ref 3.5–5.3)
PROTHROM AB SERPL-ACNC: 34.1 SEC — HIGH (ref 10–13.1)
RBC # BLD: 3.67 M/UL — LOW (ref 4.2–5.8)
RBC # FLD: 12.6 % — SIGNIFICANT CHANGE UP (ref 10.3–14.5)
SODIUM SERPL-SCNC: 133 MMOL/L — LOW (ref 135–145)
WBC # BLD: 12.44 K/UL — HIGH (ref 3.8–10.5)
WBC # FLD AUTO: 12.44 K/UL — HIGH (ref 3.8–10.5)

## 2019-05-28 PROCEDURE — 93005 ELECTROCARDIOGRAM TRACING: CPT

## 2019-05-28 PROCEDURE — 96361 HYDRATE IV INFUSION ADD-ON: CPT

## 2019-05-28 PROCEDURE — 94640 AIRWAY INHALATION TREATMENT: CPT

## 2019-05-28 PROCEDURE — 72128 CT CHEST SPINE W/O DYE: CPT

## 2019-05-28 PROCEDURE — 80053 COMPREHEN METABOLIC PANEL: CPT

## 2019-05-28 PROCEDURE — 82962 GLUCOSE BLOOD TEST: CPT

## 2019-05-28 PROCEDURE — 96375 TX/PRO/DX INJ NEW DRUG ADDON: CPT

## 2019-05-28 PROCEDURE — 83036 HEMOGLOBIN GLYCOSYLATED A1C: CPT

## 2019-05-28 PROCEDURE — 84484 ASSAY OF TROPONIN QUANT: CPT

## 2019-05-28 PROCEDURE — 96374 THER/PROPH/DIAG INJ IV PUSH: CPT

## 2019-05-28 PROCEDURE — 71046 X-RAY EXAM CHEST 2 VIEWS: CPT

## 2019-05-28 PROCEDURE — 76770 US EXAM ABDO BACK WALL COMP: CPT | Mod: 26

## 2019-05-28 PROCEDURE — 99285 EMERGENCY DEPT VISIT HI MDM: CPT | Mod: 25

## 2019-05-28 PROCEDURE — 73000 X-RAY EXAM OF COLLAR BONE: CPT

## 2019-05-28 PROCEDURE — 85027 COMPLETE CBC AUTOMATED: CPT

## 2019-05-28 PROCEDURE — 93306 TTE W/DOPPLER COMPLETE: CPT

## 2019-05-28 PROCEDURE — 70450 CT HEAD/BRAIN W/O DYE: CPT

## 2019-05-28 PROCEDURE — 97161 PT EVAL LOW COMPLEX 20 MIN: CPT

## 2019-05-28 PROCEDURE — 82553 CREATINE MB FRACTION: CPT

## 2019-05-28 PROCEDURE — 82550 ASSAY OF CK (CPK): CPT

## 2019-05-28 PROCEDURE — 85610 PROTHROMBIN TIME: CPT

## 2019-05-28 PROCEDURE — 84443 ASSAY THYROID STIM HORMONE: CPT

## 2019-05-28 PROCEDURE — 83735 ASSAY OF MAGNESIUM: CPT

## 2019-05-28 PROCEDURE — 81001 URINALYSIS AUTO W/SCOPE: CPT

## 2019-05-28 PROCEDURE — 93306 TTE W/DOPPLER COMPLETE: CPT | Mod: 26

## 2019-05-28 PROCEDURE — 80048 BASIC METABOLIC PNL TOTAL CA: CPT

## 2019-05-28 PROCEDURE — 93308 TTE F-UP OR LMTD: CPT

## 2019-05-28 PROCEDURE — 85730 THROMBOPLASTIN TIME PARTIAL: CPT

## 2019-05-28 PROCEDURE — 84100 ASSAY OF PHOSPHORUS: CPT

## 2019-05-28 PROCEDURE — 99239 HOSP IP/OBS DSCHRG MGMT >30: CPT

## 2019-05-28 PROCEDURE — 76770 US EXAM ABDO BACK WALL COMP: CPT

## 2019-05-28 RX ORDER — WARFARIN SODIUM 2.5 MG/1
1 TABLET ORAL
Qty: 0 | Refills: 0 | DISCHARGE

## 2019-05-28 RX ORDER — AZELASTINE 137 UG/1
2 SPRAY, METERED NASAL
Qty: 0 | Refills: 0 | DISCHARGE

## 2019-05-28 RX ORDER — CICLOPIROX OLAMINE 7.7 MG/G
1 CREAM TOPICAL
Qty: 0 | Refills: 0 | DISCHARGE

## 2019-05-28 RX ORDER — METOPROLOL TARTRATE 50 MG
1 TABLET ORAL
Qty: 30 | Refills: 0
Start: 2019-05-28 | End: 2019-06-26

## 2019-05-28 RX ORDER — ACETAMINOPHEN 500 MG
1 TABLET ORAL
Qty: 0 | Refills: 0 | DISCHARGE

## 2019-05-28 RX ORDER — TAMSULOSIN HYDROCHLORIDE 0.4 MG/1
1 CAPSULE ORAL
Qty: 0 | Refills: 0 | DISCHARGE

## 2019-05-28 RX ORDER — GLIMEPIRIDE 1 MG
2 TABLET ORAL
Qty: 0 | Refills: 0 | DISCHARGE

## 2019-05-28 RX ORDER — ROSUVASTATIN CALCIUM 5 MG/1
1 TABLET ORAL
Qty: 0 | Refills: 0 | DISCHARGE

## 2019-05-28 RX ORDER — TAMSULOSIN HYDROCHLORIDE 0.4 MG/1
2 CAPSULE ORAL
Qty: 0 | Refills: 0 | DISCHARGE

## 2019-05-28 RX ORDER — ACETAMINOPHEN 500 MG
2 TABLET ORAL
Qty: 0 | Refills: 0 | DISCHARGE

## 2019-05-28 RX ORDER — GLIMEPIRIDE 1 MG
1 TABLET ORAL
Qty: 0 | Refills: 0 | DISCHARGE

## 2019-05-28 RX ORDER — LIDOCAINE 4 G/100G
1 CREAM TOPICAL DAILY
Refills: 0 | Status: DISCONTINUED | OUTPATIENT
Start: 2019-05-28 | End: 2019-05-28

## 2019-05-28 RX ORDER — UMECLIDINIUM BROMIDE AND VILANTEROL TRIFENATATE 62.5; 25 UG/1; UG/1
1 POWDER RESPIRATORY (INHALATION)
Qty: 0 | Refills: 0 | DISCHARGE

## 2019-05-28 RX ORDER — DENOSUMAB 60 MG/ML
1 INJECTION SUBCUTANEOUS
Qty: 0 | Refills: 0 | DISCHARGE

## 2019-05-28 RX ORDER — FLUTICASONE PROPIONATE 220 MCG
1 AEROSOL WITH ADAPTER (GRAM) INHALATION
Qty: 0 | Refills: 0 | DISCHARGE

## 2019-05-28 RX ORDER — FLUTICASONE PROPIONATE AND SALMETEROL 50; 250 UG/1; UG/1
1 POWDER ORAL; RESPIRATORY (INHALATION)
Qty: 0 | Refills: 0 | DISCHARGE

## 2019-05-28 RX ORDER — DIPHENHYDRAMINE HCL 50 MG
1 CAPSULE ORAL
Qty: 0 | Refills: 0 | DISCHARGE

## 2019-05-28 RX ORDER — METOPROLOL TARTRATE 50 MG
1 TABLET ORAL
Qty: 0 | Refills: 0 | DISCHARGE

## 2019-05-28 RX ORDER — LANOLIN ALCOHOL/MO/W.PET/CERES
1 CREAM (GRAM) TOPICAL
Qty: 0 | Refills: 0 | DISCHARGE

## 2019-05-28 RX ORDER — MONTELUKAST 4 MG/1
1 TABLET, CHEWABLE ORAL
Qty: 0 | Refills: 0 | DISCHARGE

## 2019-05-28 RX ORDER — FINASTERIDE 5 MG/1
1 TABLET, FILM COATED ORAL
Qty: 0 | Refills: 0 | DISCHARGE

## 2019-05-28 RX ADMIN — LIDOCAINE 1 PATCH: 4 CREAM TOPICAL at 05:32

## 2019-05-28 RX ADMIN — BUDESONIDE AND FORMOTEROL FUMARATE DIHYDRATE 2 PUFF(S): 160; 4.5 AEROSOL RESPIRATORY (INHALATION) at 05:39

## 2019-05-28 RX ADMIN — LIDOCAINE 1 PATCH: 4 CREAM TOPICAL at 08:03

## 2019-05-28 RX ADMIN — Medication 650 MILLIGRAM(S): at 05:39

## 2019-05-28 RX ADMIN — Medication 2: at 08:03

## 2019-05-28 RX ADMIN — LIDOCAINE 1 PATCH: 4 CREAM TOPICAL at 12:26

## 2019-05-28 RX ADMIN — Medication 2: at 12:23

## 2019-05-28 RX ADMIN — Medication 200 MILLIGRAM(S): at 05:39

## 2019-05-28 RX ADMIN — TRAMADOL HYDROCHLORIDE 50 MILLIGRAM(S): 50 TABLET ORAL at 12:29

## 2019-05-28 RX ADMIN — LIDOCAINE 1 PATCH: 4 CREAM TOPICAL at 06:16

## 2019-05-28 RX ADMIN — FINASTERIDE 5 MILLIGRAM(S): 5 TABLET, FILM COATED ORAL at 12:26

## 2019-05-28 RX ADMIN — MONTELUKAST 10 MILLIGRAM(S): 4 TABLET, CHEWABLE ORAL at 12:27

## 2019-05-28 NOTE — PROGRESS NOTE ADULT - PROBLEM SELECTOR PLAN 7
-C/w home finasteride and tamsulosin for now.  -F/u renal US.

## 2019-05-28 NOTE — DISCHARGE NOTE PROVIDER - NSDCCPCAREPLAN_GEN_ALL_CORE_FT
PRINCIPAL DISCHARGE DIAGNOSIS  Diagnosis: Atrial flutter with rapid ventricular response  Assessment and Plan of Treatment: Atrial fibrillation is the most common heart rhythm problem.  The condition puts you at risk for has stroke and heart attack  It helps if you control your blood pressure, not drink more than 1-2 alcohol drinks per day, cut down on caffeine, getting treatment for over active thyroid gland, and get regular exercise  Call your doctor if you feel your heart racing or beating unusually, chest tightness or pain, lightheaded, faint, shortness of breath especially with exercise  It is important to take your heart medication as prescribed  You may be on anticoagulation which is very important to take as directed - you may need blood work to monitor drug levels      SECONDARY DISCHARGE DIAGNOSES  Diagnosis: Multiple falls  Assessment and Plan of Treatment: Multiple falls, Outpatient physical therapy ordered    Diagnosis: COPD (chronic obstructive pulmonary disease)  Assessment and Plan of Treatment: COPD (chronic obstructive pulmonary disease)    Diagnosis: CKD (chronic kidney disease)  Assessment and Plan of Treatment: CKD (chronic kidney disease)    Diagnosis: T2DM (type 2 diabetes mellitus)  Assessment and Plan of Treatment: HgA1C this admission.  Make sure you get your HgA1c checked every three months.  If you take oral diabetes medications, check your blood glucose two times a day.  If you take insulin, check your blood glucose before meals and at bedtime.  It's important not to skip any meals.  Keep a log of your blood glucose results and always take it with you to your doctor appointments.  Keep a list of your current medications including injectables and over the counter medications and bring this medication list with you to all your doctor appointments.  If you have not seen your ophthalmologist this year call for appointment.  Check your feet daily for redness, sores, or openings. Do not self treat. If no improvement in two days call your primary care physician for an appointment.  Low blood sugar (hypoglycemia) is a blood sugar below 70mg/dl. Check your blood sugar if you feel signs/symptoms of hypoglycemia. If your blood sugar is below 70 take 15 grams of carbohydrates (ex 4 oz of apple juice, 3-4 glucose tablets, or 4-6 oz of regular soda) wait 15 minutes and repeat blood sugar to make sure it comes up above 70.  If your blood sugar is above 70 and you are due for a meal, have a meal.  If you are not due for a meal have a snack.  This snack helps keeps your blood sugar at a safe range. PRINCIPAL DISCHARGE DIAGNOSIS  Diagnosis: Atrial flutter with rapid ventricular response  Assessment and Plan of Treatment: Atrial fibrillation is the most common heart rhythm problem.  The condition puts you at risk for has stroke and heart attack  It helps if you control your blood pressure, not drink more than 1-2 alcohol drinks per day, cut down on caffeine, getting treatment for over active thyroid gland, and get regular exercise  Call your doctor if you feel your heart racing or beating unusually, chest tightness or pain, lightheaded, faint, shortness of breath especially with exercise  It is important to take your heart medication as prescribed  You may be on anticoagulation which is very important to take as directed - you may need blood work to monitor drug levels      SECONDARY DISCHARGE DIAGNOSES  Diagnosis: Multiple falls  Assessment and Plan of Treatment: Multiple falls, Outpatient physical therapy ordered    Diagnosis: CKD (chronic kidney disease)  Assessment and Plan of Treatment: CKD (chronic kidney disease)    Diagnosis: COPD (chronic obstructive pulmonary disease)  Assessment and Plan of Treatment: COPD (chronic obstructive pulmonary disease)    Diagnosis: T2DM (type 2 diabetes mellitus)  Assessment and Plan of Treatment: HgA1C this admission.  Make sure you get your HgA1c checked every three months.  If you take oral diabetes medications, check your blood glucose two times a day.  If you take insulin, check your blood glucose before meals and at bedtime.  It's important not to skip any meals.  Keep a log of your blood glucose results and always take it with you to your doctor appointments.  Keep a list of your current medications including injectables and over the counter medications and bring this medication list with you to all your doctor appointments.  If you have not seen your ophthalmologist this year call for appointment.  Check your feet daily for redness, sores, or openings. Do not self treat. If no improvement in two days call your primary care physician for an appointment.  Low blood sugar (hypoglycemia) is a blood sugar below 70mg/dl. Check your blood sugar if you feel signs/symptoms of hypoglycemia. If your blood sugar is below 70 take 15 grams of carbohydrates (ex 4 oz of apple juice, 3-4 glucose tablets, or 4-6 oz of regular soda) wait 15 minutes and repeat blood sugar to make sure it comes up above 70.  If your blood sugar is above 70 and you are due for a meal, have a meal.  If you are not due for a meal have a snack.  This snack helps keeps your blood sugar at a safe range.

## 2019-05-28 NOTE — PHARMACOTHERAPY INTERVENTION NOTE - OUTCOME
Warfarin dosing per pharmacist    Isabel Vargas is a 49 y.o. male. Height: 5' 8\" (172.7 cm)    Weight: 70.8 kg (156 lb)    Indication:  IVC thrombus    Goal INR:  2.0 to 3.0 per hospitalist    Home dose:  New start    Risk factors or significant drug interactions:  History of alcoholism. Other anticoagulants:  Heparin drip bridge therapy    Daily Monitoring  Date  INR     Warfarin dose  HGB              Notes  7/17  1.2  5 mg   12.2  7/18  1.2  5 mg   12.0  7/19  1.1  2.5 mg + 5 mg  12.0  7/20  1.1  5 mg + 5 mg  11.8      Pharmacy consulted to start warfarin for IVC thrombus. S/p IVC Venogram.  Suction thrombectomy. Supra-renal IVC Filter retrieval and replacement into the infra-renal IVC. Heme/Onc consulted and workup for hypercoagulable state pending. Per Heme/Onc note, they would be okay transitioning eventually to a NOAC, however documentation from hospitalist states cost of NOAC's is a barrier and is opting for now to treat patient with warfarin. Oddly enough, INR today still has not moved on day 4 of warfarin therapy. INR today still subtherapeutic at 1.1. Question if patient is genetically a fast metabolizer of warfarin requiring higher doses than normal to achieve therapeutic INR. In an effort to get INR trending up, will give 5 mg now and 5 mg this evening for a total dose today of 10 mg. Will follow daily INR for changes. Continue heparin drip as bridge therapy for now. Ideally would continue at least 5 days and until INR therapeutic X 24 hours.     Thank you,  Derian Uriostegui, PharmD  Clinical Pharmacist  [phone] accepted

## 2019-05-28 NOTE — PROGRESS NOTE ADULT - PROBLEM SELECTOR PLAN 3
- JUAN A on CKD appears likely secondary to dehydration. -Improving s/p IVF's.   - Baseline Cr appears to ~2.6 per HIE [id number]  - Hydronephrosis partially visualized on thoracic CT and with hx of BPH  - Renal US to eval the hydronephrosis  - Can bladder scan to monitor for any retention - JUAN A on CKD appears likely secondary to dehydration  - Improving s/p IVF's.   - Baseline Cr appears to ~2.6 per HIE [id number]  - Hydronephrosis partially visualized on thoracic CT and with hx of BPH  - Renal US to eval the hydronephrosis  - Can bladder scan to monitor for any retention

## 2019-05-28 NOTE — PROGRESS NOTE ADULT - ASSESSMENT
77 y/o Male w/ Pmhx of  AF/AFlutter, ablation in 2012, , CKD, BPH, early Parkinson's, colon cancer s/p colectomy, now here with atypical atrial flutter with rapid ventricular response, but converted to sinus.    # AFlutter w/ RVR  - Now Sinus on telemetry w/ pacs, brief episode of PAT, PVCs, couplets  - Continue w/ Toprol  200 mg po daily  - [id number] Vasc score of  continue w/ coumadin (INR: 2.87 today)  - Monitor and replete electrolytes for goal Mg++2; K+4  - Continue telemetry monitoring    [id number]

## 2019-05-28 NOTE — PROGRESS NOTE ADULT - PROBLEM SELECTOR PLAN 4
- On glipizide at home. Last A1c 8.3 06/18  - HbA1c 8.4  - C/w GABE QAC/HS for now. -Will monitor to see if patient needs any premeal insulin and/or long-acting insulin  - Consistent carbohydrate diet
-On glipizide at home. Last A1c 8.3 06/18  -HbA1c 8.4  -C/w GABE QAC/HS for now. -Will monitor to see if patient needs any premeal insulin and/or long-acting insulin.   -Consistent carbohydrate diet.
-On glipizide at home. Last A1c 8.3 06/18  -HbA1c 8.4  -C/w GABE QAC/HS for now. -Will monitor to see if patient needs any premeal insulin and/or long-acting insulin.   -Consistent carbohydrate diet.

## 2019-05-28 NOTE — PROGRESS NOTE ADULT - PROBLEM SELECTOR PLAN 8
-DVT prophylaxis with warfarin.  -PT consult recs home with outpatient services.   -Code status: Patient had expressed wishes to be DNR, but family to bring some paperwork from home regarding advanced directives first prior to initiating.
-DVT prophylaxis with warfarin.  -PT consult recs home with outpatient services.   -Code status: Patient had expressed wishes to be DNR, but family to bring some paperwork from home regarding advanced directives first prior to initiating.
-DVT prophylaxis with warfarin.  -PT consult.  -Code status: Patient had expressed wishes to be DNR, but family to bring some paperwork from home regarding advanced directives first prior to initiating.

## 2019-05-28 NOTE — PROGRESS NOTE ADULT - PROBLEM SELECTOR PLAN 6
-C/w symbicort and bev q6 PRN  -c/w home montelukast

## 2019-05-28 NOTE — PROGRESS NOTE ADULT - PROBLEM SELECTOR PROBLEM 1
Atrial flutter with rapid ventricular response

## 2019-05-28 NOTE — DISCHARGE NOTE PROVIDER - HOSPITAL COURSE
78M PMH of AF/AFlutter (on coumadin, s/p ablation 2012), T2DM (last A1c 8.3 06/18), COPD (not on home O2, never intubated/MICU), CKD, BPH, early Parkinson's (gait abnormalities, fully alert and oriented, full capacity, some STM loss), C. diff (2015), colon cancer (s/p colectomy) and osteoporosis; presented 2 days after a fall at home found to be in A. Flutter with RVR likely 2/2 to dehydration from decreased PO intake since fall.         5/28: Seen and eval. hemodynamically stable. Denies any HA, CP, SOB. comfortable. Labs and vitals reviewed.         # Atrial flutter with rapid ventricular response    - good rate control / rhythm control - PAT, PVCs, couplets    - therapeutic INR    - Toprol xl 200 mg po qdaily    - had short run of Afib again today and has some TWI's in V1-V6, therefore will keep patient to get echocardiogram tomorrow and observe.    - Aflutter with RVR may have been 2/2 to dehydration    - Trops negative    - TSH normal.        # Leukocytosis    - WBC still > 12    - Unclear cause     - patient is non-toxic    - no signs of infection    - CXR with clear lungs    - No clinical signs of infection /  no fevers - monitor off antibiotics    - patient states that he follows up with hemotology        # CKD (chronic kidney disease)    - JUAN A on CKD appears likely secondary to dehydration    - Improving s/p IVF's.     - Baseline Cr appears to ~2.6 per HIE [id number]    - Hydronephrosis partially visualized on thoracic CT and with hx of BPH    - Renal US to eval the hydronephrosis    - Can bladder scan to monitor for any retention.         # T2DM (type 2 diabetes mellitus)    - On glipizide at home. Last A1c 8.3 06/18    - HbA1c 8.4    - C/w GABE QAC/HS for now. -Will monitor to see if patient needs any premeal insulin and/or long-acting insulin    - Consistent carbohydrate diet.         # Multiple falls    - Description of falls sounds mechanical, likely in the setting of Parkinson's. ?Also possibly precipitated by uncontrolled Aflutter. ?Possibly due to orthostatic changes.     - C/w telemetry.     - Orthostatics were positive, but not symptomatic. Will monitor.    - Tramadol PRN for pains for now, patient says he takes it at home, however not found in iSTOP. If becomes sedated, may need to stop this as this could also contribute to falls. Patient denies that this has been a problem in the past     - Right clavicular X-ray negative for fracture    - Lidocaine patch for pain.          # COPD       - c/w symbicort and duonebs q6 PRN    - c/w home montelukast.        # BPH    - c/w finasteride / tamsulosin     - renal US: complex right renal cyst with internal echogenic material which may represent a complex cyst. Malignancy cannot be excluded. Correlation with contrast CT or ultrasound can be performed for further evaluation. No right hydronephrosis. Central left renal cyst. Mild dilatation of the left upper pole collecting system. 78M PMH of AF/AFlutter (on coumadin, s/p ablation 2012), T2DM (last A1c 8.3 06/18), COPD (not on home O2, never intubated/MICU), CKD, BPH, early Parkinson's (gait abnormalities, fully alert and oriented, full capacity, some STM loss), C. diff (2015), colon cancer (s/p colectomy) and osteoporosis; presented 2 days after a fall at home found to be in A. Flutter with RVR likely 2/2 to dehydration from decreased PO intake since fall.         5/28: Seen and eval. hemodynamically stable. Denies any HA, CP, SOB. comfortable.     Renal US: complex right renal cyst with internal echogenic material which may represent a complex cyst. Malignancy cannot be excluded. Correlation with contrast CT or ultrasound can be performed for further evaluation. No right hydronephrosis. Central left renal cyst. Mild dilatation of the left upper pole collecting system.            # Atrial flutter with rapid ventricular response    - good rate control / rhythm control - PAT, PVCs, couplets    - therapeutic INR    - Toprol xl 200 mg po qdaily    - had short run of Afib again today and has some TWI's in V1-V6, therefore will keep patient to get echocardiogram tomorrow and observe.    - Aflutter with RVR may have been 2/2 to dehydration    - Trops negative    - TSH normal.        # Leukocytosis    - WBC still > 12    - Unclear cause     - patient is non-toxic    - no signs of infection    - CXR with clear lungs    - No clinical signs of infection /  no fevers - monitor off antibiotics    - patient states that he follows up with hemotology        # CKD (chronic kidney disease)    - JUAN A on CKD appears likely secondary to dehydration    - Improving s/p IVF's.     - Baseline Cr appears to ~2.6 per HIE [id number]    - Hydronephrosis partially visualized on thoracic CT and with hx of BPH    - Renal US to eval the hydronephrosis    - Can bladder scan to monitor for any retention.         # T2DM (type 2 diabetes mellitus)    - On glipizide at home. Last A1c 8.3 06/18    - HbA1c 8.4    - C/w GABE QAC/HS for now. -Will monitor to see if patient needs any premeal insulin and/or long-acting insulin    - Consistent carbohydrate diet.         # Multiple falls    - Description of falls sounds mechanical, likely in the setting of Parkinson's. ?Also possibly precipitated by uncontrolled Aflutter. ?Possibly due to orthostatic changes.     - C/w telemetry.     - Orthostatics were positive, but not symptomatic. Will monitor.    - Tramadol PRN for pains for now, patient says he takes it at home, however not found in iSTOP. If becomes sedated, may need to stop this as this could also contribute to falls. Patient denies that this has been a problem in the past     - Right clavicular X-ray negative for fracture    - Lidocaine patch for pain.          # COPD       - c/w symbicort and duonebs q6 PRN    - c/w home montelukast.        # BPH    - c/w finasteride / tamsulosin     - renal US: complex right renal cyst with internal echogenic material which may represent a complex cyst. Malignancy cannot be excluded. Correlation with contrast CT or ultrasound can be performed for further evaluation. No right hydronephrosis. Central left renal cyst. Mild dilatation of the left upper pole collecting system. 78M PMH of AF/AFlutter (on coumadin, s/p ablation 2012), T2DM (last A1c 8.3 06/18), COPD (not on home O2, never intubated/MICU), CKD, BPH, early Parkinson's (gait abnormalities, fully alert and oriented, full capacity, some STM loss), C. diff (2015), colon cancer (s/p colectomy) and osteoporosis; presented 2 days after a fall at home found to be in A. Flutter with RVR likely 2/2 to dehydration from decreased PO intake since fall.         5/28: Seen and eval. hemodynamically stable. Denies any HA, CP, SOB. comfortable.     Renal US: complex right renal cyst with internal echogenic material which may represent a complex cyst. Malignancy cannot be excluded. Correlation with contrast CT or ultrasound can be performed for further evaluation. No right hydronephrosis. Central left renal cyst. Mild dilatation of the left upper pole collecting system.            # Atrial flutter with rapid ventricular response    - good rate control / rhythm control - PAT, PVCs, couplets    - therapeutic INR    - Toprol xl 200 mg po qdaily    - had short run of Afib again today and has some TWI's in V1-V6, therefore will keep patient to get echocardiogram tomorrow and observe.    - Aflutter with RVR may have been 2/2 to dehydration    - Trops negative    - TSH normal.        # Leukocytosis    - WBC still > 12    - Unclear cause     - patient is non-toxic    - no signs of infection    - CXR with clear lungs    - No clinical signs of infection /  no fevers - monitor off antibiotics    - patient states that he follows up with hemotology        # CKD (chronic kidney disease)    - JUAN A on CKD appears likely secondary to dehydration    - Improving s/p IVF's.     - Baseline Cr appears to ~2.6 per HIE [id number]    - Hydronephrosis partially visualized on thoracic CT and with hx of BPH    - Renal US to eval the hydronephrosis    - Can bladder scan to monitor for any retention.         # T2DM (type 2 diabetes mellitus)    - On glipizide at home. Last A1c 8.3 06/18    - HbA1c 8.4    - C/w GABE QAC/HS for now. -Will monitor to see if patient needs any premeal insulin and/or long-acting insulin    - Consistent carbohydrate diet.         # Multiple falls    - Description of falls sounds mechanical, likely in the setting of Parkinson's. ?Also possibly precipitated by uncontrolled Aflutter. ?Possibly due to orthostatic changes.     - C/w telemetry.     - Orthostatics were positive, but not symptomatic. Will monitor.    - Tramadol PRN for pains for now, patient says he takes it at home, however not found in iSTOP. If becomes sedated, may need to stop this as this could also contribute to falls. Patient denies that this has been a problem in the past     - Right clavicular X-ray negative for fracture    - Lidocaine patch for pain.          # COPD       - c/w symbicort and duonebs q6 PRN    - c/w home montelukast.        # BPH    - c/w finasteride / tamsulosin     - renal US: complex right renal cyst with internal echogenic material which may represent a complex cyst. Malignancy cannot be excluded. Correlation with contrast CT or ultrasound can be performed for further evaluation. No right hydronephrosis. Central left renal cyst. Mild dilatation of the left upper pole collecting system.    - Patient states that he has followed up with urologist in the past has had a CT scans in the past -  notes that it has been stable.

## 2019-05-28 NOTE — DISCHARGE NOTE PROVIDER - CARE PROVIDER_API CALL
Shaina Esparza)  [place] [address]  Phone: [phone]  Fax: [phone]  Follow Up Time: 2 weeks

## 2019-05-28 NOTE — PHARMACOTHERAPY INTERVENTION NOTE - COMMENTS
Reviewed home and anticipated discharge medications with patient - doses, indications, and possible side effects.    Lalita Lala, PharmD   [phone]

## 2019-05-28 NOTE — PROGRESS NOTE ADULT - PROBLEM SELECTOR PLAN 1
- good rate control / rhythm control  - therapeutic INR  - Toprol xl 200 mg po qdaily  - had short run of Afib again today and has some TWI's in V1-V6, therefore will keep patient to get echocardiogram tomorrow and observe.  - Aflutter with RVR may have been 2/2 to dehydration  - Trops negative  - TSH normal - good rate control / rhythm control - PAT, PVCs, couplets  - therapeutic INR  - Toprol xl 200 mg po qdaily  - had short run of Afib again today and has some TWI's in V1-V6, therefore will keep patient to get echocardiogram tomorrow and observe.  - Aflutter with RVR may have been 2/2 to dehydration  - Trops negative  - TSH normal

## 2019-05-28 NOTE — PROGRESS NOTE ADULT - PROBLEM SELECTOR PLAN 2
- WBC still > 12  - Unclear cause   - patient is non-toxic  - no signs of infection  - CXR with clear lungs  - No clinical signs of infection /  no fevers - monitor off antibiotics.
-WBC still > 12. -Unclear cause.   -UA appears negative for infection. CXR with clear lungs.  -Will monitor off antibiotics at this time.
-WBC still > 12. -Unclear cause. ?Stress reaction from fall.   -UA appears negative for infection. CXR with clear lungs.  -Will monitor off antibiotics at this time.

## 2019-05-28 NOTE — DISCHARGE NOTE NURSING/CASE MANAGEMENT/SOCIAL WORK - NSDCDPATPORTLINK_GEN_ALL_CORE
You can access the [facility] [facility], offered by [facility], by registering with the following website: [email or web address]

## 2019-05-28 NOTE — PROGRESS NOTE ADULT - SUBJECTIVE AND OBJECTIVE BOX
Patient is a 78y old  Male who presents with a chief complaint of s/p fall (27 May 2019 15:09)    SUBJECTIVE / OVERNIGHT EVENTS:   Seen and eval. hemodynamically stable. Denies any HA, CP, SOB. tele with good rate control. Care discussed with EP NP. Patient is planned to have abdominal US and then d/c planning. Patient is recommended to have outpatient monitoring of WBC count.     PHYSICAL EXAM:   T(C): 36.9 (28 May 2019 04:32), Max: 37.6 (27 May 2019 20:43)  T(F): 98.4 (28 May 2019 04:32), Max: 99.7 (27 May 2019 20:43)  HR: 80 (28 May 2019 04:32) (74 - 84)  BP: 148/84 (28 May 2019 04:32) (123/75 - 152/81)  RR: 18 (28 May 2019 04:32) (16 - 18)  SpO2: 97% (28 May 2019 04:32) (95% - 97%)  GENERAL: NAD, well-developed  HEAD:  Atraumatic, Normocephalic  EYES: Conjunctiva and sclera clear  NECK: Supple  CHEST/LUNG: Mild crackles in lung bases (R>L).   HEART: S1S2 normal. Regular rate and rhythm; No murmurs, rubs, or gallops  ABDOMEN: Soft, Nontender, Nondistended; Bowel sounds present  EXTREMITIES: No LE edema. Normal ROM of right arm at shoulder, but some reported discomfort.   PSYCH/Neuro: AAOx3. Mild ?tremor.   SKIN: No rashes or lesions    LABS:             CBC Full  -  ( 28 May 2019 09:25 )  WBC Count : 12.44 K/uL  RBC Count : 3.67 M/uL  Hemoglobin : 12.3 g/dL  Hematocrit : 37.2 %  Platelet Count - Automated : 146 K/uL    PT/INR - ( 28 May 2019 08:33 )   PT: 34.1 sec;   INR: 2.87 ratio      133<L>  |  98  |  35<H>  ----------------------------<  212<H>  4.8   |  20<L>  |  2.18<H>    Ca    9.3      28 May 2019 06:30    < from: Xray Clavicle, Right (05.27.19 @ 15:24) >  IMPRESSION:   Nonspecific small calcific/ossific densities project adjacent to the   inferior aspect of the mid to distal clavicular shaft between the   clavicle and the coracoid, question related to prior ligamentous injury.   No acute displaced fracture is seen in the right clavicular shaft.   Rounded calcification projecting adjacent to the humeral head, possible   calcific tendinopathy. The AC joint is not widened. The coracoclavicular   distance is not widened.    < end of copied text >      Telemetry reviewed by me: Sinus 60-80's. PVC's. Run of Aflutter for 6 seconds up to 140's. Some PAT.     RADIOLOGY & ADDITIONAL TESTS:    Imaging Personally Reviewed: Clavicle X-ray report.   Consultant(s) Notes Reviewed:  EP  Care Discussed with Consultants/Other Providers: Patient is a 78y old  Male who presents with a chief complaint of s/p fall (27 May 2019 15:09)    SUBJECTIVE / OVERNIGHT EVENTS:   Seen and eval. hemodynamically stable. Denies any HA, CP, SOB. Tele with good rate control. Care discussed with EP NP. Patient is planned to have abdominal US and then d/c planning. Patient is recommended to have outpatient monitoring of WBC count - patient states that every couple of month he follows up with hematology for blood count monitoring.     PHYSICAL EXAM:   T(C): 36.9 (28 May 2019 04:32), Max: 37.6 (27 May 2019 20:43)  T(F): 98.4 (28 May 2019 04:32), Max: 99.7 (27 May 2019 20:43)  HR: 80 (28 May 2019 04:32) (74 - 84)  BP: 148/84 (28 May 2019 04:32) (123/75 - 152/81)  RR: 18 (28 May 2019 04:32) (16 - 18)  SpO2: 97% (28 May 2019 04:32) (95% - 97%)  GENERAL: NAD, well-developed  HEAD:  Atraumatic, Normocephalic  EYES: Conjunctiva and sclera clear  NECK: Supple  CHEST/LUNG: Mild crackles in lung bases (R>L).   HEART: S1S2 normal. Regular rate and rhythm; No murmurs, rubs, or gallops  ABDOMEN: Soft, Nontender, Nondistended; Bowel sounds present  EXTREMITIES: No LE edema. Normal ROM of right arm at shoulder, but some reported discomfort.   PSYCH/Neuro: AAOx3. Mild ?tremor.   SKIN: No rashes or lesions    LABS:             CBC Full  -  ( 28 May 2019 09:25 )  WBC Count : 12.44 K/uL  RBC Count : 3.67 M/uL  Hemoglobin : 12.3 g/dL  Hematocrit : 37.2 %  Platelet Count - Automated : 146 K/uL    PT/INR - ( 28 May 2019 08:33 )   PT: 34.1 sec;   INR: 2.87 ratio      133<L>  |  98  |  35<H>  ----------------------------<  212<H>  4.8   |  20<L>  |  2.18<H>    Ca    9.3      28 May 2019 06:30    < from: Xray Clavicle, Right (05.27.19 @ 15:24) >  IMPRESSION:   Nonspecific small calcific/ossific densities project adjacent to the   inferior aspect of the mid to distal clavicular shaft between the   clavicle and the coracoid, question related to prior ligamentous injury.   No acute displaced fracture is seen in the right clavicular shaft.   Rounded calcification projecting adjacent to the humeral head, possible   calcific tendinopathy. The AC joint is not widened. The coracoclavicular   distance is not widened.    < end of copied text >      Telemetry reviewed by me: Sinus 60-80's. PVC's. Run of Aflutter for 6 seconds up to 140's. Some PAT.     RADIOLOGY & ADDITIONAL TESTS:    Imaging Personally Reviewed: Clavicle X-ray report.   Consultant(s) Notes Reviewed:  EP  Care Discussed with Consultants/Other Providers:

## 2019-05-28 NOTE — PROGRESS NOTE ADULT - SUBJECTIVE AND OBJECTIVE BOX
INTERVAL HPI/OVERNIGHT EVENTS: Patient seen and examined,; lying in bed, in no acute distress. He denies chest pain/sob/dizziness/palpitations.     MEDICATIONS  (STANDING):  buDESOnide 160 MICROgram(s)/formoterol 4.5 MICROgram(s) Inhaler 2 Puff(s) Inhalation two times a day  dextrose 5%. 1000 milliLiter(s) (50 mL/Hr) IV Continuous <Continuous>  dextrose 50% Injectable 12.5 Gram(s) IV Push once  dextrose 50% Injectable 25 Gram(s) IV Push once  dextrose 50% Injectable 25 Gram(s) IV Push once  finasteride 5 milliGRAM(s) Oral daily  insulin lispro (HumaLOG) corrective regimen sliding scale   SubCutaneous three times a day before meals  insulin lispro (HumaLOG) corrective regimen sliding scale   SubCutaneous at bedtime  lidocaine   Patch 1 Patch Transdermal daily  lidocaine   Patch 1 Patch Transdermal daily  melatonin 5 milliGRAM(s) Oral at bedtime  metoprolol succinate  milliGRAM(s) Oral daily  montelukast 10 milliGRAM(s) Oral daily  tamsulosin 0.8 milliGRAM(s) Oral at bedtime    MEDICATIONS  (PRN):  acetaminophen   Tablet .. 650 milliGRAM(s) Oral every 6 hours PRN Mild Pain (1 - 3)  ALBUTerol/ipratropium for Nebulization 3 milliLiter(s) Nebulizer every 6 hours PRN Shortness of Breath and/or Wheezing  dextrose 40% Gel 15 Gram(s) Oral once PRN Blood Glucose LESS THAN 70 milliGRAM(s)/deciliter  diphenhydrAMINE 25 milliGRAM(s) Oral at bedtime PRN [place]  glucagon  Injectable 1 milliGRAM(s) IntraMuscular once PRN Glucose LESS THAN 70 milligrams/deciliter  traMADol 50 milliGRAM(s) Oral two times a day PRN Severe Pain (7 - 10)      Allergies    amoxicillin (Pruritus; Rash)  nitrofurantoin (Unknown)  penicillins (Pruritus; Rash)    Intolerances    clindamycin (Stomach Upset)    ROS:  General: Pt denies fevers/chills  Cardiovascular: denies chest pain/palpitations/dizziness  Respiratory and Thorax: denies SOB  Gastrointestinal: denies abdominal pain/diarrhea/constipation/bloody stool  Musculoskeletal: + intermittent left , posterior, shoulder pain from recent fall.   Hematologic: denies abnormal bleeding    Vital Signs Last 24 Hrs  T(C): 36.9 (28 May 2019 04:32), Max: 37.6 (27 May 2019 20:43)  T(F): 98.4 (28 May 2019 04:32), Max: 99.7 (27 May 2019 20:43)  HR: 80 (28 May 2019 04:32) (74 - 84)  BP: 148/84 (28 May 2019 04:32) (123/75 - 152/81)  BP(mean): --  RR: 18 (28 May 2019 04:32) (16 - 18)  SpO2: 97% (28 May 2019 04:32) (95% - 97%)    Physical Exam:  Constitutional: well developed, well nourished and in  no acute distress  Neurological: Alert & Oriented x 3,  no focal deficits  Respiratory:  Breathing nonlabored; CTA bilaterally.   Cardiovascular: (+) S1 & S2  Gastrointestinal: softly distended, NT/ no rebound,  (+) BS  Extremities: +2 bilateral radial pulses. No pedal edema.   Skin:  normal skin color and pigmentation, no skin lesions noted.     LABS:                        12.3   12.44 )-----------( 146      ( 28 May 2019 09:25 )             37.2     05-28    133<L>  |  98  |  35<H>  ----------------------------<  212<H>  4.8   |  20<L>  |  2.18<H>    Ca    9.3      28 May 2019 06:30      PT/INR - ( 28 May 2019 08:33 )   PT: 34.1 sec;   INR: 2.87 ratio           RADIOLOGY & ADDITIONAL TESTS: < from: Transthoracic Echocardiogram w/Doppler (08.10.12 @ 07:37) >  Patient name: LEAH HOLLAND  YOB: 1940   Age: 71 (M)   MR#: [id number]  Study Date: 8/10/2012  [facility] Torrey Ash RDCS  Study quality: Technically difficult  Referring Physician: Charli Hinds MD  Blood Pressure: 113/78 mmHg  Height: 5ft 10in  Weight: 150 lb  BSA: 1.9 m2  ------------------------------------------------------------------------  PROCEDURE: Transthoracic echocardiogram with 2-D, M-Mode  and complete spectral and color flow Doppler.  INDICATION: Palpitations (785.1)  ------------------------------------------------------------------------  DIMENSIONS:  Dimensions:     Normal Values:  LA:     3.0 cm    2.0 - 4.0 cm  Ao:     3.4 cm    2.0 - 3.8 cm  SEPTUM: 0.8 cm    0.6 - 1.2 cm  PWT:    0.7 cm    0.6 - 1.1 cm  LVIDd:  4.6 cm    3.0 - 5.6 cm  LVIDs:  3.1 cm    1.8 - 4.0 cm  Derived Variables:  LVMI: 59 g/m2  RWT: 0.30  Fractional short: 33 %  Ejection Fraction: 61 %  ------------------------------------------------------------------------  OBSERVATIONS:  Mitral Valve: Mitral annular calcification, otherwise  normal mitral valve. Minimal mitral regurgitation.  Aortic Root: Normal aortic root.  Aortic Valve: Calcified trileaflet aortic valve with normal  opening.  Left Ventricle: Endocardium not well visualized; grossly  normal left ventricular systolic function. Normal left  ventricular internal dimensions and wall thicknesses. Mild  diastolic dysfunction (Stage I).  Right Heart: Normal right atrium. Normal right ventricular  size and function. Normal tricuspid valve.  Minimal  tricuspid regurgitation. Normal pulmonic valve. Minimal  pulmonic regurgitation.  Pericardium/PleuraNormal pericardium with no pericardial  effusion.  Hemodynamic: Estimated right atrial pressure is 10 mm Hg.  ------------------------------------------------------------------------  CONCLUSIONS:  1. Mitral annular calcification, otherwise normal mitral  valve. Minimal mitral regurgitation.  2. Normal left ventricular internal dimensionsand wall  thicknesses.  3. Endocardium not well visualized; grossly normal left  ventricular systolic function.  4. Mild diastolic dysfunction (Stage I).  5. Normal right ventricular size and function.  ------------------------------------------------------------------------  Confirmed on  8/10/2012 - 08:35:45 by Isaias Hilton M.D.  ------------------------------------------------------------------------      TELE: Sinus 70's- 80's. PACs, PVCs, couplets. one episode of PAT up to 120s lasting 2.9 seconds earlier this am.

## 2019-06-09 ENCOUNTER — EMERGENCY (EMERGENCY)
Facility: HOSPITAL | Age: 79
LOS: 1 days | Discharge: HOME | End: 2019-06-09
Attending: EMERGENCY MEDICINE
Payer: COMMERCIAL

## 2019-06-09 VITALS
OXYGEN SATURATION: 97 % | DIASTOLIC BLOOD PRESSURE: 86 MMHG | RESPIRATION RATE: 18 BRPM | TEMPERATURE: 98 F | SYSTOLIC BLOOD PRESSURE: 153 MMHG | HEART RATE: 66 BPM

## 2019-06-09 VITALS
HEIGHT: 69 IN | OXYGEN SATURATION: 97 % | DIASTOLIC BLOOD PRESSURE: 76 MMHG | RESPIRATION RATE: 18 BRPM | TEMPERATURE: 98 F | HEART RATE: 67 BPM | WEIGHT: 141.98 LBS | SYSTOLIC BLOOD PRESSURE: 152 MMHG

## 2019-06-09 DIAGNOSIS — Z98.89 OTHER SPECIFIED POSTPROCEDURAL STATES: Chronic | ICD-10-CM

## 2019-06-09 DIAGNOSIS — Z90.49 ACQUIRED ABSENCE OF OTHER SPECIFIED PARTS OF DIGESTIVE TRACT: Chronic | ICD-10-CM

## 2019-06-09 LAB
ALBUMIN SERPL ELPH-MCNC: 4 G/DL — SIGNIFICANT CHANGE UP (ref 3.3–5)
ALP SERPL-CCNC: 107 U/L — SIGNIFICANT CHANGE UP (ref 40–120)
ALT FLD-CCNC: 30 U/L — SIGNIFICANT CHANGE UP (ref 10–45)
ANION GAP SERPL CALC-SCNC: 12 MMOL/L — SIGNIFICANT CHANGE UP (ref 5–17)
ANION GAP SERPL CALC-SCNC: 13 MMOL/L — SIGNIFICANT CHANGE UP (ref 5–17)
APPEARANCE UR: CLEAR — SIGNIFICANT CHANGE UP
APTT BLD: 43.2 SEC — HIGH (ref 27.5–36.3)
AST SERPL-CCNC: 49 U/L — HIGH (ref 10–40)
BASOPHILS # BLD AUTO: 0.1 K/UL — SIGNIFICANT CHANGE UP (ref 0–0.2)
BASOPHILS NFR BLD AUTO: 1 % — SIGNIFICANT CHANGE UP (ref 0–2)
BILIRUB SERPL-MCNC: 0.3 MG/DL — SIGNIFICANT CHANGE UP (ref 0.2–1.2)
BILIRUB UR-MCNC: NEGATIVE — SIGNIFICANT CHANGE UP
BUN SERPL-MCNC: 34 MG/DL — HIGH (ref 7–23)
BUN SERPL-MCNC: 35 MG/DL — HIGH (ref 7–23)
CALCIUM SERPL-MCNC: 10.2 MG/DL — SIGNIFICANT CHANGE UP (ref 8.4–10.5)
CALCIUM SERPL-MCNC: 10.8 MG/DL — HIGH (ref 8.4–10.5)
CHLORIDE SERPL-SCNC: 103 MMOL/L — SIGNIFICANT CHANGE UP (ref 96–108)
CHLORIDE SERPL-SCNC: 99 MMOL/L — SIGNIFICANT CHANGE UP (ref 96–108)
CO2 SERPL-SCNC: 20 MMOL/L — LOW (ref 22–31)
CO2 SERPL-SCNC: 24 MMOL/L — SIGNIFICANT CHANGE UP (ref 22–31)
COLOR SPEC: YELLOW — SIGNIFICANT CHANGE UP
CREAT SERPL-MCNC: 2.1 MG/DL — HIGH (ref 0.5–1.3)
CREAT SERPL-MCNC: 2.24 MG/DL — HIGH (ref 0.5–1.3)
DIFF PNL FLD: NEGATIVE — SIGNIFICANT CHANGE UP
EOSINOPHIL # BLD AUTO: 0.2 K/UL — SIGNIFICANT CHANGE UP (ref 0–0.5)
EOSINOPHIL NFR BLD AUTO: 1.5 % — SIGNIFICANT CHANGE UP (ref 0–6)
GAS PNL BLDV: SIGNIFICANT CHANGE UP
GLUCOSE SERPL-MCNC: 196 MG/DL — HIGH (ref 70–99)
GLUCOSE SERPL-MCNC: 233 MG/DL — HIGH (ref 70–99)
GLUCOSE UR QL: ABNORMAL
HCT VFR BLD CALC: 44.1 % — SIGNIFICANT CHANGE UP (ref 39–50)
HGB BLD-MCNC: 15.1 G/DL — SIGNIFICANT CHANGE UP (ref 13–17)
INR BLD: 3.21 RATIO — HIGH (ref 0.88–1.16)
KETONES UR-MCNC: NEGATIVE — SIGNIFICANT CHANGE UP
LEUKOCYTE ESTERASE UR-ACNC: NEGATIVE — SIGNIFICANT CHANGE UP
LYMPHOCYTES # BLD AUTO: 19.3 % — SIGNIFICANT CHANGE UP (ref 13–44)
LYMPHOCYTES # BLD AUTO: 2.3 K/UL — SIGNIFICANT CHANGE UP (ref 1–3.3)
MCHC RBC-ENTMCNC: 34.2 GM/DL — SIGNIFICANT CHANGE UP (ref 32–36)
MCHC RBC-ENTMCNC: 34.2 PG — HIGH (ref 27–34)
MCV RBC AUTO: 100 FL — SIGNIFICANT CHANGE UP (ref 80–100)
MONOCYTES # BLD AUTO: 0.8 K/UL — SIGNIFICANT CHANGE UP (ref 0–0.9)
MONOCYTES NFR BLD AUTO: 6.8 % — SIGNIFICANT CHANGE UP (ref 2–14)
NEUTROPHILS # BLD AUTO: 8.5 K/UL — HIGH (ref 1.8–7.4)
NEUTROPHILS NFR BLD AUTO: 71.4 % — SIGNIFICANT CHANGE UP (ref 43–77)
NITRITE UR-MCNC: NEGATIVE — SIGNIFICANT CHANGE UP
PH UR: 6.5 — SIGNIFICANT CHANGE UP (ref 5–8)
PLATELET # BLD AUTO: 284 K/UL — SIGNIFICANT CHANGE UP (ref 150–400)
POTASSIUM SERPL-MCNC: 4.6 MMOL/L — SIGNIFICANT CHANGE UP (ref 3.5–5.3)
POTASSIUM SERPL-MCNC: 5.5 MMOL/L — HIGH (ref 3.5–5.3)
POTASSIUM SERPL-SCNC: 4.6 MMOL/L — SIGNIFICANT CHANGE UP (ref 3.5–5.3)
POTASSIUM SERPL-SCNC: 5.5 MMOL/L — HIGH (ref 3.5–5.3)
PROT SERPL-MCNC: 8 G/DL — SIGNIFICANT CHANGE UP (ref 6–8.3)
PROT UR-MCNC: ABNORMAL
PROTHROM AB SERPL-ACNC: 37.9 SEC — HIGH (ref 10–12.9)
PTH-INTACT FLD-MCNC: 28 PG/ML — SIGNIFICANT CHANGE UP (ref 15–65)
RBC # BLD: 4.41 M/UL — SIGNIFICANT CHANGE UP (ref 4.2–5.8)
RBC # FLD: 11.7 % — SIGNIFICANT CHANGE UP (ref 10.3–14.5)
SODIUM SERPL-SCNC: 135 MMOL/L — SIGNIFICANT CHANGE UP (ref 135–145)
SODIUM SERPL-SCNC: 136 MMOL/L — SIGNIFICANT CHANGE UP (ref 135–145)
SP GR SPEC: 1.02 — SIGNIFICANT CHANGE UP (ref 1.01–1.02)
TSH SERPL-MCNC: 0.84 UIU/ML — SIGNIFICANT CHANGE UP (ref 0.27–4.2)
UROBILINOGEN FLD QL: NEGATIVE — SIGNIFICANT CHANGE UP
WBC # BLD: 11.9 K/UL — HIGH (ref 3.8–10.5)
WBC # FLD AUTO: 11.9 K/UL — HIGH (ref 3.8–10.5)

## 2019-06-09 PROCEDURE — 99284 EMERGENCY DEPT VISIT MOD MDM: CPT | Mod: 25

## 2019-06-09 PROCEDURE — 99283 EMERGENCY DEPT VISIT LOW MDM: CPT

## 2019-06-09 PROCEDURE — 83605 ASSAY OF LACTIC ACID: CPT

## 2019-06-09 PROCEDURE — 84132 ASSAY OF SERUM POTASSIUM: CPT

## 2019-06-09 PROCEDURE — 85027 COMPLETE CBC AUTOMATED: CPT

## 2019-06-09 PROCEDURE — 85610 PROTHROMBIN TIME: CPT

## 2019-06-09 PROCEDURE — 82947 ASSAY GLUCOSE BLOOD QUANT: CPT

## 2019-06-09 PROCEDURE — 82435 ASSAY OF BLOOD CHLORIDE: CPT

## 2019-06-09 PROCEDURE — 93005 ELECTROCARDIOGRAM TRACING: CPT

## 2019-06-09 PROCEDURE — 93010 ELECTROCARDIOGRAM REPORT: CPT

## 2019-06-09 PROCEDURE — 85014 HEMATOCRIT: CPT

## 2019-06-09 PROCEDURE — 80053 COMPREHEN METABOLIC PANEL: CPT

## 2019-06-09 PROCEDURE — 84295 ASSAY OF SERUM SODIUM: CPT

## 2019-06-09 PROCEDURE — 80048 BASIC METABOLIC PNL TOTAL CA: CPT

## 2019-06-09 PROCEDURE — 82310 ASSAY OF CALCIUM: CPT

## 2019-06-09 PROCEDURE — 83970 ASSAY OF PARATHORMONE: CPT

## 2019-06-09 PROCEDURE — 85730 THROMBOPLASTIN TIME PARTIAL: CPT

## 2019-06-09 PROCEDURE — 81001 URINALYSIS AUTO W/SCOPE: CPT

## 2019-06-09 PROCEDURE — 82330 ASSAY OF CALCIUM: CPT

## 2019-06-09 PROCEDURE — 36591 DRAW BLOOD OFF VENOUS DEVICE: CPT

## 2019-06-09 PROCEDURE — 84443 ASSAY THYROID STIM HORMONE: CPT

## 2019-06-09 PROCEDURE — 82803 BLOOD GASES ANY COMBINATION: CPT

## 2019-06-09 RX ORDER — SODIUM CHLORIDE 9 MG/ML
1000 INJECTION, SOLUTION INTRAVENOUS ONCE
Refills: 0 | Status: COMPLETED | OUTPATIENT
Start: 2019-06-09 | End: 2019-06-09

## 2019-06-09 RX ADMIN — SODIUM CHLORIDE 2000 MILLILITER(S): 9 INJECTION, SOLUTION INTRAVENOUS at 21:09

## 2019-06-09 NOTE — ED ADULT NURSE NOTE - NSIMPLEMENTINTERV_GEN_ALL_ED
Implemented All Fall with Harm Risk Interventions:  [place] to call system. Call bell, personal items and telephone within reach. Instruct patient to call for assistance. Room bathroom lighting operational. Non-slip footwear when patient is off stretcher. Physically safe environment: no spills, clutter or unnecessary equipment. Stretcher in lowest position, wheels locked, appropriate side rails in place. Provide visual cue, wrist band, yellow gown, etc. Monitor gait and stability. Monitor for mental status changes and reorient to person, place, and time. Review medications for side effects contributing to fall risk. Reinforce activity limits and safety measures with patient and family. Provide visual clues: red socks.

## 2019-06-09 NOTE — ED PROVIDER NOTE - ATTENDING CONTRIBUTION TO CARE
Attending MD Robbins:  I personally have seen and examined this patient.  Resident note reviewed and agree on plan of care and except where noted.  See HPI, PE, and MDM for details.

## 2019-06-09 NOTE — ED PROVIDER NOTE - CLINICAL SUMMARY MEDICAL DECISION MAKING FREE TEXT BOX
Attending MD Robbins: 78M with ho Parkinson's, CKD, COPD presenting from home with report of abnormal labs. Labs provided verbally by phone to patient most notable for acute kidney injury (SCr 2.64) and hypercalcemia (Ca 15.1), leukocytosis (13). Patient complaints of weight loss and poor PO intake. If hypercalcemia confirmed, primary concern for malignancy related hypercalcemia given reported history of colon CA. Plan for repeat labs, empiric IV fluid bolus (slow bolus given CKD), EKG likely admission for further investigation.

## 2019-06-09 NOTE — ED ADULT NURSE NOTE - OBJECTIVE STATEMENT
78y Male PMH COPD, BPH, hld, DM, htn, Afib presents to the ED for abnormal labs. Pt states that a few weeks ago he had a fall off of a stool and was admitted due to not being able to walk anymore. Pt denies any fractures but states he has been having neck pain since. Pt states he went to PMD Friday for blood work and was called today for abnormal labs, states that the PMD told him that his calcium was elevated and that he had an acute kidney injury and to go to ED. Pt states that he has not felt like himself since the fall a few weeks ago. 78y Male PMH COPD, BPH, hld, DM, htn, Afib presents to the ED for abnormal labs. Pt states that a few weeks ago he had a fall off of a stool and was admitted due to not being able to walk anymore. Pt denies any fractures but states he has been having neck pain since. Pt states he went to PMD Friday for blood work and was called today for abnormal labs, states that the PMD told him that his calcium was elevated and that he had an acute kidney injury and to go to ED. Pt states that he has not felt like himself since the fall a few weeks ago. Pt a&oX4, ambulatory, well in appearance, airway intact, breathing spontaneously and unlabored, lung sounds clear bilaterally, pt moving all extremities, full ROM to neck, skin intact, cap refill <3 seconds, +peripheral pulses, pt denies ha, dizziness, CP, SOB, numbness/tingling, chills/fever, dysuria. MD at bedside for eval. safety maintained.

## 2019-06-09 NOTE — ED PROVIDER NOTE - PSH
Basal Cell Cancer  excision left ear  H/O colectomy  open right colectomy  H/O colonoscopy with polypectomy  2015 - + for malignancy  History of bone marrow biopsy    S/P aortic aneurysm repair  2010  S/P Lumbar Discectomy  in 2004  S/P tonsillectomy

## 2019-06-09 NOTE — ED PROVIDER NOTE - NSFOLLOWUPINSTRUCTIONS_ED_ALL_ED_FT
You were seen in the ED for abnormal blood work. Your repeat bloodwork here reveals no immediately concerning abnormalities.     Please call your doctor to arrange a follow up for a re-evaluation and repeat blood work in 2-3 days.     Return for any concerns.

## 2019-06-09 NOTE — ED PROVIDER NOTE - OBJECTIVE STATEMENT
79yo male PMH BPH, OA, COPD, HTN, DM2, atrial fibrillation presenting for abnormal labs from PMD. Patient states that he fell a few weeks ago off stool, had no fractures but hadn't been able to ambulate and was admitted to hospital. Since then, patient states he hasn't felt like himself, +weight loss over last 3 weeks. Went to PMD yesterday to have routine bloodwork, there he was found to have Acute Kidney Injury, hypercalcemia, and leukocytosis. Patient verbally told of labs today over phone and told to come to ED. Patient denies fevers/chills. no chest pain/SOB. No nausea/vomiting. Urinating normally.    PMD: Shaina Esparza

## 2019-06-09 NOTE — ED PROVIDER NOTE - PHYSICAL EXAMINATION
Gen: NAD, AOx3  Head: NCAT  HEENT: oral mucosa dry, normal conjunctiva, oropharynx clear without exudate or erythema  Lung: CTAB, no respiratory distress, no wheezing, rales, rhonchi  CV: normal s1/s2, rrr, no murmurs, Normal perfusion  Abd: soft, NTND  MSK: No edema, no visible deformities, full range of motion in all 4 extremities  Neuro: No focal neurologic deficits  Skin: No rash   Psych: normal affect

## 2019-06-09 NOTE — ED ADULT NURSE REASSESSMENT NOTE - NS ED NURSE REASSESS COMMENT FT1
PT with no complaints. Pt verbalizes discharge instructions and follow up. Pt accompanied by family.

## 2019-06-09 NOTE — ED PROVIDER NOTE - PROGRESS NOTE DETAILS
Attending MD Robbins: labs reviewed, white count 11 (down from 13 without intervention) so infection is not suspected in this patient. SCr 2.24 which is baseline for patient. Calcium is 10.9. Will repeat potassium as was hemolyzed, if potassium is normal, patient does not need admission and may follow up with her PMD for repeat blood work and testing in 2-3 days given ongoing weight loss and poor appetite.

## 2019-06-10 LAB — CALCIUM SERPL-MCNC: 10.6 MG/DL — HIGH (ref 8.4–10.5)

## 2019-06-16 NOTE — PHYSICAL EXAM
[General Appearance - Well Developed] : well developed [Normal Conjunctiva] : the conjunctiva exhibited no abnormalities [General Appearance - Well Nourished] : well nourished [Normal Oral Mucosa] : normal oral mucosa [Normal Jugular Venous V Waves Present] : normal jugular venous V waves present [Normal Oropharynx] : normal oropharynx [] : no respiratory distress [Respiration, Rhythm And Depth] : normal respiratory rhythm and effort [Auscultation Breath Sounds / Voice Sounds] : lungs were clear to auscultation bilaterally [Heart Rate And Rhythm] : heart rate and rhythm were normal [Heart Sounds] : normal S1 and S2 [Murmurs] : no murmurs present [Arterial Pulses Normal] : the arterial pulses were normal [Edema] : no peripheral edema present [Bowel Sounds] : normal bowel sounds [Abdomen Soft] : soft [Abnormal Walk] : normal gait [Nail Clubbing] : no clubbing of the fingernails [Cyanosis, Localized] : no localized cyanosis [Skin Color & Pigmentation] : normal skin color and pigmentation [Skin Turgor] : normal skin turgor [Oriented To Time, Place, And Person] : oriented to person, place, and time [Impaired Insight] : insight and judgment were intact

## 2019-06-18 ENCOUNTER — NON-APPOINTMENT (OUTPATIENT)
Age: 79
End: 2019-06-18

## 2019-06-18 ENCOUNTER — APPOINTMENT (OUTPATIENT)
Dept: ELECTROPHYSIOLOGY | Facility: CLINIC | Age: 79
End: 2019-06-18
Payer: MEDICARE

## 2019-06-18 VITALS
OXYGEN SATURATION: 96 % | DIASTOLIC BLOOD PRESSURE: 76 MMHG | HEIGHT: 69 IN | HEART RATE: 59 BPM | BODY MASS INDEX: 21.48 KG/M2 | SYSTOLIC BLOOD PRESSURE: 148 MMHG | WEIGHT: 145 LBS

## 2019-06-18 PROCEDURE — 99215 OFFICE O/P EST HI 40 MIN: CPT

## 2019-06-18 PROCEDURE — 93000 ELECTROCARDIOGRAM COMPLETE: CPT

## 2019-06-18 NOTE — REVIEW OF SYSTEMS
[Eyeglasses] : currently wearing eyeglasses [see HPI] : see HPI [Negative] : Heme/Lymph [Feeling Fatigued] : feeling fatigued [FreeTextEntry2] : Moes surgery of the left ear (with graft)

## 2019-06-18 NOTE — HISTORY OF PRESENT ILLNESS
[FreeTextEntry1] : s/p recent [facility] hospitalization:  78M PMH of AF/AFlutter (on coumadin, s/p ablation 2012), T2DM (last A1c 8.3 06/18), COPD (not on home O2, never intubated/MICU), CKD, BPH, early Parkinson's (gait abnormalities, fully alert and oriented, full capacity, some STM loss), C. diff (2015), colon cancer (s/p colectomy) and osteoporosis; presented 2 days after a fall at home found to be in A. Flutter with RVR likely 2/2 to dehydration from decreased PO intake since fall.  Metoprolol increased to 200 mg daily and he was discharged to follow up as an outpatient.  He clarifies that he was on a ladder changing an air conditioning filter when he fell off.  He had pain so he came to the hospital. The pain improved, but he was found to be in flutter.  NO palpitations. NO chest pain. No lightheadedness/dizziness.  His metoprolol was increased to 200 mg daily.  He has noted increased fatigue since this adjustment. He can still do his normal activities, but does feel that he needs to nap more often. \par \par \par

## 2019-06-28 ENCOUNTER — APPOINTMENT (OUTPATIENT)
Dept: NEUROLOGY | Facility: CLINIC | Age: 79
End: 2019-06-28
Payer: MEDICARE

## 2019-06-28 VITALS
WEIGHT: 147 LBS | HEIGHT: 69 IN | SYSTOLIC BLOOD PRESSURE: 104 MMHG | BODY MASS INDEX: 21.77 KG/M2 | DIASTOLIC BLOOD PRESSURE: 64 MMHG | HEART RATE: 67 BPM

## 2019-06-28 PROCEDURE — 99213 OFFICE O/P EST LOW 20 MIN: CPT

## 2019-06-29 NOTE — HISTORY OF PRESENT ILLNESS
[FreeTextEntry1] : 78-year-old left-handed man seen 6 months ago with a recent onset of left hand tremor and micrographia. He is advised to return for reevaluation today and he notes no worsening of tremor or handwriting.

## 2019-06-29 NOTE — CONSULT LETTER
[Dear  ___] : Dear  [unfilled], [Courtesy Letter:] : I had the pleasure of seeing your patient, [unfilled], in my office today. [Sincerely,] : Sincerely, [FreeTextEntry2] : Shaina Esparza MD\par [facility]\par [place] [facility] [Please see my note below.] : Please see my note below.

## 2019-06-29 NOTE — ASSESSMENT
[FreeTextEntry1] : Very mild stigmata of PD  Pharmacotherapy at this time is not warrented.\par \par Reevaluate in one year.

## 2019-06-29 NOTE — REVIEW OF SYSTEMS
[As Noted in HPI] : as noted in HPI [Nocturia] : nocturia [Negative] : Heme/Lymph [FreeTextEntry8] : 1-2x

## 2019-06-29 NOTE — ASSESSMENT
[FreeTextEntry1] : A very mild left anup-parkinsonism which does not require pharmacotherapy at this time.\par \par Reevaluate in 6 months.

## 2019-06-29 NOTE — HISTORY OF PRESENT ILLNESS
[FreeTextEntry1] : 78 yr-old left-handed man first seen one year ago with mild left hand resting tremor and micrographia. His mild parkinsonism has not progressed.

## 2019-06-29 NOTE — PHYSICAL EXAM
[FreeTextEntry1] : Alert and oriented. No aphasia. No cognitive deficits. Cranial nerves intact. No hypomimia. He walks with diminished left arm swing and has mild cogwheel rigidity. Resting left hand intermittent tremor. Postural reflexes intact. No other focal neurologic deficits. Writing with left hand shows some micrographia.

## 2019-06-29 NOTE — PHYSICAL EXAM
[FreeTextEntry1] : Alert and oriented. No aphasia. No cognitive deficits. Cranial nerves intact. No hypomimia. He walks with diminished left arm swing and has mild cogwheel rigidity. Resting left hand intermittent tremor. Postural reflexes intact. No other focal neurologic deficits. Writing with left hand shows some micrographia. \par  \par

## 2019-07-03 ENCOUNTER — APPOINTMENT (OUTPATIENT)
Dept: UROLOGY | Facility: CLINIC | Age: 79
End: 2019-07-03
Payer: MEDICARE

## 2019-07-03 PROCEDURE — 99213 OFFICE O/P EST LOW 20 MIN: CPT

## 2019-07-03 NOTE — LETTER BODY
[Dear  ___] : Dear  [unfilled], [Attached please find my note.] : Attached please find my note. [Thank you very much for allowing me to participate in the care of this patient. If you have any questions, please do not hesitate to contact me] : Thank you very much for allowing me to participate in the care of this patient. If you have any questions, please do not hesitate to contact me. [FreeTextEntry1] : ACP\par [address] \[place], [place], [address]  \par [phone] \par

## 2019-07-03 NOTE — ASSESSMENT
[FreeTextEntry1] : Urination is back to baseline. He will try to obtain for us urine culture results if done. Ultrasound was reviewed. Large renal cyst seems to be unchanged. I do not believe that previously a complex cyst was seen on the right side. For further evaluation, a noncontrast MRI can be done given his renal insufficiency. He can call me for the results.\par \par Kwame Brady MD, FACS\par [facility]\par  of Urology\par \par [address], [address]\[place] [address]\par \par [address], [address]\[place] [address]\par \par Tel: [phone]\par Fax: [phone]

## 2019-07-03 NOTE — HISTORY OF PRESENT ILLNESS
[FreeTextEntry1] : He is a 78-year-old man who seen today in follow-up for elevated PSA and complex renal cyst. Nocturia is 2-3 times. Recently he had worsening urinary frequency, urgency and dysuria. He went to an urgent care center and was given doxycycline. He was told that urinalysis showed white and red blood cells. He feels improves. In July 2019, PSA level was 1.9. Ultrasound in May 2019 showed a left renal 7 cm cyst and right 1.6 cm complex renal cyst. Residual urine today was minimal. He is on tamsulosin 0.8 mg and finasteride. He has chronic renal insufficiency with creatinine level around 2.5. CT scan of abdomen and pelvis without contrast in April 2018 showed a 10 cm left renal cyst unchanged from before.\par \par Previous notes: Renal ultrasound in January 2016 showed complex cyst in the left kidney 7.9 cm and other bilateral renal cysts including 2 complex cysts in the left kidney up to 1.8 cm. Non-contrast CT scan in May 2016 showed punctate bilateral renal stones, renal cysts of varying sizes largest on the left side.\par He underwent cystoscopy in 11/2013 which showed a large prostate and bleeding from the surface of the prostate. He had 2 prostate biopsies in the past. Previous CT scans including a CT scan with contrast in 2010 indicated that he had a large parapelvic renal cyst.

## 2019-07-03 NOTE — PHYSICAL EXAM
[General Appearance - Well Developed] : well developed [General Appearance - Well Nourished] : well nourished [Normal Appearance] : normal appearance [Well Groomed] : well groomed [General Appearance - In No Acute Distress] : no acute distress [Abdomen Soft] : soft [Abdomen Tenderness] : non-tender [Costovertebral Angle Tenderness] : no ~M costovertebral angle tenderness [Urethral Meatus] : meatus normal [Penis Abnormality] : normal circumcised penis [Urinary Bladder Findings] : the bladder was normal on palpation [Scrotum] : the scrotum was normal [Testes Tenderness] : no tenderness of the testes [Epididymis] : the epididymides were normal [Testes Mass (___cm)] : there were no testicular masses [Prostate Tenderness] : the prostate was not tender [No Prostate Nodules] : no prostate nodules [Prostate Enlarged] : was enlarged [FreeTextEntry1] : ELVER done on 3/2019 [] : no respiratory distress [Respiration, Rhythm And Depth] : normal respiratory rhythm and effort [Exaggerated Use Of Accessory Muscles For Inspiration] : no accessory muscle use [Oriented To Time, Place, And Person] : oriented to person, place, and time [Affect] : the affect was normal [Mood] : the mood was normal [Not Anxious] : not anxious

## 2019-07-05 LAB — BACTERIA UR CULT: NORMAL

## 2019-08-19 ENCOUNTER — MESSAGE (OUTPATIENT)
Age: 79
End: 2019-08-19

## 2019-09-18 ENCOUNTER — APPOINTMENT (OUTPATIENT)
Dept: SPINE | Facility: CLINIC | Age: 79
End: 2019-09-18
Payer: MEDICARE

## 2019-09-18 VITALS
SYSTOLIC BLOOD PRESSURE: 146 MMHG | HEART RATE: 66 BPM | DIASTOLIC BLOOD PRESSURE: 80 MMHG | HEIGHT: 69 IN | WEIGHT: 152.4 LBS | BODY MASS INDEX: 22.57 KG/M2

## 2019-09-18 DIAGNOSIS — M54.2 CERVICALGIA: ICD-10-CM

## 2019-09-18 PROCEDURE — 99203 OFFICE O/P NEW LOW 30 MIN: CPT

## 2019-09-18 NOTE — REASON FOR VISIT
[New Patient Visit] : a new patient visit [Referred By: _________] : Patient was referred by PATRICE [Spouse] : spouse [FreeTextEntry1] : Cervical neck pain and recent fall

## 2019-09-18 NOTE — ED ADULT TRIAGE NOTE - MEANS OF ARRIVAL
From: Beverley Hayes  To: Kalpana CYNTHIA DO Claudia  Sent: 9/18/2019 10:13 AM CDT  Subject: Medication Question    Good morning,  Two things actually... I'm pretty sure I have a sinus infection again. Same headache, same sore throat. I'm taking sudafed, but that is really only helping the headache. Is there anything I can take that will help both the head and the throat?    Second, I'm extremely stressed. I had a migraine on Monday that kept me in bed all day. I'm sure part was the sinus' but the back of my head and neck still hurt tremendously. I don't sleep well at night, and feel run down and of course, my head hurts all day. Is there anything besides the prescription i have for alprazolam that I could take for stress? The alprazolam puts me to sleep so it's not an option for day time use.    I know that the first thing you'll want is me to come in to see you, but i can't afford that. The last visit cost me a little more than $250.  Thanks,  Candie   wheelchair

## 2019-09-18 NOTE — ASSESSMENT
[FreeTextEntry1] : Assess:\par Cervical stenosis\par No weakness of UE \par \par PLAN:\par Removed cervical collar\par No collar recommended\par No surgery recommended\par If weakness occurs he will return

## 2019-09-18 NOTE — HISTORY OF PRESENT ILLNESS
[de-identified] : 78 year old male who presents with a history of fall  in May 28, 2019 at [facility] while hew aas getting an IVs filter.  he fell and found to ein AF ib A flutter.  he was medically manged and he c/o neck pain with tingling down shis spine. A cerivccal collar was placed an dhe sas dischareged.  he is now being seen for cervicla pain and ha hightower MRI\par He has resoltion of his pain while th ecollar is intact .  He is waliing normal an d has no urine or bowel symptpms.  He reort weaknes sof his UE about htre eyrars ago

## 2019-11-12 ENCOUNTER — MEDICATION RENEWAL (OUTPATIENT)
Age: 79
End: 2019-11-12

## 2019-12-12 ENCOUNTER — APPOINTMENT (OUTPATIENT)
Dept: UROLOGY | Facility: CLINIC | Age: 79
End: 2019-12-12

## 2019-12-25 NOTE — PHYSICAL EXAM
[General Appearance - Well Nourished] : well nourished [Normal Oral Mucosa] : normal oral mucosa [General Appearance - Well Developed] : well developed [Normal Conjunctiva] : the conjunctiva exhibited no abnormalities [Normal Jugular Venous V Waves Present] : normal jugular venous V waves present [] : no respiratory distress [Normal Oropharynx] : normal oropharynx [Auscultation Breath Sounds / Voice Sounds] : lungs were clear to auscultation bilaterally [Heart Rate And Rhythm] : heart rate and rhythm were normal [Heart Sounds] : normal S1 and S2 [Respiration, Rhythm And Depth] : normal respiratory rhythm and effort [Murmurs] : no murmurs present [Edema] : no peripheral edema present [Arterial Pulses Normal] : the arterial pulses were normal [Bowel Sounds] : normal bowel sounds [Abnormal Walk] : normal gait [Nail Clubbing] : no clubbing of the fingernails [Cyanosis, Localized] : no localized cyanosis [Abdomen Soft] : soft [Impaired Insight] : insight and judgment were intact [Skin Turgor] : normal skin turgor [Skin Color & Pigmentation] : normal skin color and pigmentation [Oriented To Time, Place, And Person] : oriented to person, place, and time

## 2019-12-26 ENCOUNTER — NON-APPOINTMENT (OUTPATIENT)
Age: 79
End: 2019-12-26

## 2019-12-26 ENCOUNTER — APPOINTMENT (OUTPATIENT)
Dept: ELECTROPHYSIOLOGY | Facility: CLINIC | Age: 79
End: 2019-12-26
Payer: MEDICARE

## 2019-12-26 VITALS
DIASTOLIC BLOOD PRESSURE: 80 MMHG | OXYGEN SATURATION: 100 % | HEART RATE: 66 BPM | WEIGHT: 152 LBS | SYSTOLIC BLOOD PRESSURE: 148 MMHG | HEIGHT: 69 IN | BODY MASS INDEX: 22.51 KG/M2

## 2019-12-26 PROCEDURE — 93000 ELECTROCARDIOGRAM COMPLETE: CPT

## 2019-12-26 PROCEDURE — 99213 OFFICE O/P EST LOW 20 MIN: CPT

## 2019-12-26 RX ORDER — MONTELUKAST 10 MG/1
10 TABLET, FILM COATED ORAL DAILY
Refills: 0 | Status: ACTIVE | COMMUNITY
Start: 2019-12-26

## 2019-12-26 RX ORDER — MONTELUKAST SODIUM 10 MG/1
10 TABLET, FILM COATED ORAL
Qty: 1 | Refills: 1 | Status: COMPLETED | COMMUNITY
End: 2019-12-26

## 2019-12-26 RX ORDER — LEVOCETIRIZINE DIHYDROCHLORIDE 5 MG/1
5 TABLET ORAL DAILY
Qty: 10 | Refills: 0 | Status: ACTIVE | COMMUNITY
Start: 2019-12-26

## 2019-12-26 NOTE — HISTORY OF PRESENT ILLNESS
[FreeTextEntry1] : No palpitations.  No chest pain. No shortness of breath. No lightheadedness/dizziness. He usually walks for exercise, but this is weather dependent. \par \par

## 2019-12-26 NOTE — REASON FOR VISIT
[Spouse] : spouse [Follow-Up - Clinic] : a clinic follow-up of [Atrial Fibrillation] : atrial fibrillation

## 2019-12-26 NOTE — REVIEW OF SYSTEMS
[see HPI] : see HPI [Eyeglasses] : currently wearing eyeglasses [Negative] : Constitutional [FreeTextEntry2] : Moes surgery of the left ear (with graft)

## 2019-12-26 NOTE — DISCUSSION/SUMMARY
[FreeTextEntry1] : Doing well on metoprolol 200 mg.  He has elevated BP today. I explained that BP control is important in preventing recurrent arrhythmia.  He will follow up with his PMD for this. Continue oral AC.  FOllow up in 6 months.

## 2020-05-14 ENCOUNTER — APPOINTMENT (OUTPATIENT)
Dept: UROLOGY | Facility: CLINIC | Age: 80
End: 2020-05-14

## 2020-06-17 ENCOUNTER — APPOINTMENT (OUTPATIENT)
Dept: UROLOGY | Facility: CLINIC | Age: 80
End: 2020-06-17
Payer: MEDICARE

## 2020-06-17 VITALS
HEART RATE: 65 BPM | HEIGHT: 69 IN | DIASTOLIC BLOOD PRESSURE: 67 MMHG | RESPIRATION RATE: 14 BRPM | WEIGHT: 152 LBS | SYSTOLIC BLOOD PRESSURE: 115 MMHG | OXYGEN SATURATION: 96 % | BODY MASS INDEX: 22.51 KG/M2 | TEMPERATURE: 97.2 F

## 2020-06-17 PROCEDURE — 99213 OFFICE O/P EST LOW 20 MIN: CPT

## 2020-06-17 NOTE — REVIEW OF SYSTEMS
[see HPI] : see HPI [Nocturia] : nocturia [Incontinence] : incontinence [Negative] : Gastrointestinal

## 2020-06-17 NOTE — ASSESSMENT
[FreeTextEntry1] : He will continue with the medication therapy as indicated above for now for his urinary symptoms. Residual urine has remained stable. In followup to renal cysts, he will undergo ultrasound. Otherwise, he can followup in 6-9 months.\par \par Kwame Brady MD, FACS\par [facility]\par  of Urology\par \par [address], [address]\[place] [address]\par \par [address], [address]\[place] [address]\par \par Tel: [phone]\par Fax: [phone]

## 2020-06-17 NOTE — LETTER BODY
[Thank you very much for allowing me to participate in the care of this patient. If you have any questions, please do not hesitate to contact me] : Thank you very much for allowing me to participate in the care of this patient. If you have any questions, please do not hesitate to contact me. [Attached please find my note.] : Attached please find my note. [Dear  ___] : Dear  [unfilled], [FreeTextEntry1] : ACP\par [address] \[place], [place], [address]  \par [phone] \par

## 2020-06-17 NOTE — PHYSICAL EXAM
[Well Groomed] : well groomed [Normal Appearance] : normal appearance [General Appearance - Well Developed] : well developed [General Appearance - Well Nourished] : well nourished [Abdomen Soft] : soft [Abdomen Tenderness] : non-tender [General Appearance - In No Acute Distress] : no acute distress [Costovertebral Angle Tenderness] : no ~M costovertebral angle tenderness [Urethral Meatus] : meatus normal [Penis Abnormality] : normal circumcised penis [Epididymis] : the epididymides were normal [Urinary Bladder Findings] : the bladder was normal on palpation [Testes Tenderness] : no tenderness of the testes [Scrotum] : the scrotum was normal [Testes Mass (___cm)] : there were no testicular masses [No Prostate Nodules] : no prostate nodules [Prostate Tenderness] : the prostate was not tender [Prostate Enlarged] : was enlarged [FreeTextEntry1] : ELVER done on 6/2020.  [] : no respiratory distress [Respiration, Rhythm And Depth] : normal respiratory rhythm and effort [Exaggerated Use Of Accessory Muscles For Inspiration] : no accessory muscle use [Affect] : the affect was normal [Oriented To Time, Place, And Person] : oriented to person, place, and time [Not Anxious] : not anxious [Mood] : the mood was normal

## 2020-06-17 NOTE — HISTORY OF PRESENT ILLNESS
[FreeTextEntry1] : He is a 79-year-old man who seen today in follow-up for elevated PSA and complex renal cyst. He thinks that urination is somewhat worse and rarely he has urge incontinence. He is on Flomax 0.8 mg and finasteride. Residual urine volume has remained relatively stable, about 30 cc. MRI of the abdomen in August 2019 without contrast showed bilateral simple and hemorrhagic renal cysts, 10.5 cm in the left kidney and 1.5 cm in the right kidney. PSA level was 1.9 in July 2019.\par \par Ultrasound in May 2019 showed a left renal 7 cm cyst and right 1.6 cm complex renal cyst. He has chronic renal insufficiency with creatinine level around 2.5. CT scan of abdomen and pelvis without contrast in April 2018 showed a 10 cm left renal cyst unchanged from before.\par \par Previous notes: Renal ultrasound in January 2016 showed complex cyst in the left kidney 7.9 cm and other bilateral renal cysts including 2 complex cysts in the left kidney up to 1.8 cm. Non-contrast CT scan in May 2016 showed punctate bilateral renal stones, renal cysts of varying sizes largest on the left side.\par He underwent cystoscopy in 11/2013 which showed a large prostate and bleeding from the surface of the prostate. He had 2 prostate biopsies in the past. Previous CT scans including a CT scan with contrast in 2010 indicated that he had a large parapelvic renal cyst.

## 2020-06-18 LAB
APPEARANCE: CLEAR
BACTERIA: NEGATIVE
BILIRUBIN URINE: NEGATIVE
BLOOD URINE: NEGATIVE
COLOR: YELLOW
GLUCOSE QUALITATIVE U: ABNORMAL
HYALINE CASTS: 3 /LPF
KETONES URINE: NEGATIVE
LEUKOCYTE ESTERASE URINE: NEGATIVE
MICROSCOPIC-UA: NORMAL
NITRITE URINE: NEGATIVE
PH URINE: 6.5
PROTEIN URINE: ABNORMAL
RED BLOOD CELLS URINE: 4 /HPF
SPECIFIC GRAVITY URINE: 1.02
SQUAMOUS EPITHELIAL CELLS: 0 /HPF
UROBILINOGEN URINE: NORMAL
WHITE BLOOD CELLS URINE: 1 /HPF

## 2020-06-19 LAB — BACTERIA UR CULT: NORMAL

## 2020-07-17 ENCOUNTER — APPOINTMENT (OUTPATIENT)
Dept: NEUROLOGY | Facility: CLINIC | Age: 80
End: 2020-07-17
Payer: MEDICARE

## 2020-07-17 VITALS
HEART RATE: 58 BPM | HEIGHT: 69 IN | BODY MASS INDEX: 23.11 KG/M2 | SYSTOLIC BLOOD PRESSURE: 154 MMHG | DIASTOLIC BLOOD PRESSURE: 70 MMHG | WEIGHT: 156 LBS

## 2020-07-17 VITALS — TEMPERATURE: 97.6 F

## 2020-07-17 PROCEDURE — 99214 OFFICE O/P EST MOD 30 MIN: CPT

## 2020-07-18 NOTE — PHYSICAL EXAM
[FreeTextEntry1] : Mild tremor drawing an Archimedes spiral and micrographia observed. Walks with decreased left arm swing. Mild rigidity of left upper limb. Postural reflexes intact No cognitive or communication deficits. Cranial nerves intact.

## 2020-07-18 NOTE — CONSULT LETTER
[Dear  ___] : Dear ~NADIA, [Courtesy Letter:] : I had the pleasure of seeing your patient, [unfilled], in my office today. [Please see my note below.] : Please see my note below. [Sincerely,] : Sincerely, [FreeTextEntry2] : Evelyn Cage NP\par [facility]\par [address] [place]\par [place] [id number]

## 2020-07-18 NOTE — ASSESSMENT
[FreeTextEntry1] : Start amantadine 100 mg q AM. Call in one week or if any adverse effects noted. Reevaluate in 3 months.

## 2020-07-18 NOTE — HISTORY OF PRESENT ILLNESS
[FreeTextEntry1] : 79 yr-old left-handed man first seen two years ago with mild left hand resting tremor and micrographia. His mild parkinsonism has slowly progressed.

## 2020-09-01 ENCOUNTER — APPOINTMENT (OUTPATIENT)
Dept: ELECTROPHYSIOLOGY | Facility: CLINIC | Age: 80
End: 2020-09-01
Payer: MEDICARE

## 2020-09-01 ENCOUNTER — NON-APPOINTMENT (OUTPATIENT)
Age: 80
End: 2020-09-01

## 2020-09-01 VITALS
OXYGEN SATURATION: 97 % | HEART RATE: 67 BPM | WEIGHT: 155 LBS | DIASTOLIC BLOOD PRESSURE: 72 MMHG | BODY MASS INDEX: 22.96 KG/M2 | SYSTOLIC BLOOD PRESSURE: 144 MMHG | HEIGHT: 69 IN

## 2020-09-01 PROCEDURE — 93000 ELECTROCARDIOGRAM COMPLETE: CPT

## 2020-09-01 PROCEDURE — 99213 OFFICE O/P EST LOW 20 MIN: CPT

## 2020-09-01 RX ORDER — CALCITRIOL 0.25 UG/1
0.25 CAPSULE, LIQUID FILLED ORAL
Qty: 90 | Refills: 3 | Status: ACTIVE | COMMUNITY

## 2020-09-01 RX ORDER — AMANTADINE HYDROCHLORIDE 100 MG/1
100 CAPSULE ORAL
Qty: 30 | Refills: 2 | Status: DISCONTINUED | COMMUNITY
Start: 2020-07-17 | End: 2020-09-01

## 2020-09-01 NOTE — DISCUSSION/SUMMARY
[FreeTextEntry1] : 79 year ole man doing well on metoprolol for paroxysmal atrial arrhythmias. Continue AC (on Coumadin- dosed by Dr. Esparza). Follow up in 6 months.

## 2020-09-01 NOTE — HISTORY OF PRESENT ILLNESS
[FreeTextEntry1] : No palpitations.  No chest pain. No shortness of breath. No lightheadedness/dizziness. He has been indoors and walking through the supermarket etc.  No complaints.

## 2020-09-01 NOTE — PHYSICAL EXAM
[General Appearance - Well Developed] : well developed [General Appearance - Well Nourished] : well nourished [Normal Conjunctiva] : the conjunctiva exhibited no abnormalities [Normal Oral Mucosa] : normal oral mucosa [Normal Oropharynx] : normal oropharynx [Normal Jugular Venous V Waves Present] : normal jugular venous V waves present [] : no respiratory distress [Respiration, Rhythm And Depth] : normal respiratory rhythm and effort [Auscultation Breath Sounds / Voice Sounds] : lungs were clear to auscultation bilaterally [Heart Rate And Rhythm] : heart rate and rhythm were normal [Heart Sounds] : normal S1 and S2 [Murmurs] : no murmurs present [Arterial Pulses Normal] : the arterial pulses were normal [Edema] : no peripheral edema present [Bowel Sounds] : normal bowel sounds [Abdomen Soft] : soft [Abnormal Walk] : normal gait [Nail Clubbing] : no clubbing of the fingernails [Cyanosis, Localized] : no localized cyanosis [Skin Color & Pigmentation] : normal skin color and pigmentation [Skin Turgor] : normal skin turgor [Oriented To Time, Place, And Person] : oriented to person, place, and time [Impaired Insight] : insight and judgment were intact

## 2020-11-05 ENCOUNTER — APPOINTMENT (OUTPATIENT)
Dept: NEUROLOGY | Facility: CLINIC | Age: 80
End: 2020-11-05
Payer: MEDICARE

## 2020-11-05 VITALS
BODY MASS INDEX: 23.55 KG/M2 | HEIGHT: 69 IN | HEART RATE: 59 BPM | WEIGHT: 159 LBS | DIASTOLIC BLOOD PRESSURE: 74 MMHG | SYSTOLIC BLOOD PRESSURE: 149 MMHG

## 2020-11-05 VITALS — TEMPERATURE: 96.7 F

## 2020-11-05 PROCEDURE — 99072 ADDL SUPL MATRL&STAF TM PHE: CPT

## 2020-11-05 PROCEDURE — 99213 OFFICE O/P EST LOW 20 MIN: CPT

## 2020-11-08 NOTE — HISTORY OF PRESENT ILLNESS
[FreeTextEntry1] : 79 yr-old left-handed man seen 4 months ago with mild left hemiparkinsonism and resting tremor and micrographia. A trial of amantadine caused lightheadedness and it was stopped. The tremor has not progressed.

## 2020-11-08 NOTE — PHYSICAL EXAM
[FreeTextEntry1] : Alert and oriented/No cognitive deficits. Mild resting tremor and cogwheeling at left wrist and elbow. Walks with diminished left arm swing. Postural reflexes intact.

## 2020-12-11 NOTE — ED ADULT NURSE NOTE - NS ED NOTE ABUSE RESPONSE YN
Hospitalist Progress Note      SYNOPSIS: Patient admitted on 12/3/2020 for ARF (acute respiratory failure) ([facility] [address]      SUBJECTIVE:    Patient seen and examined  Records reviewed.      Trach peg cancelled today      Temp (24hrs), Av.8 °F (37.7 °C), Min:99.3 °F (37.4 °C), Max:100 °F (37.8 °C)    DIET: Diet NPO, After Midnight Exceptions are: Sips with Meds  CODE: Full Code    Intake/Output Summary (Last 24 hours) at 2020 1729  Last data filed at 2020 1700  Gross per 24 hour   Intake 4921.26 ml   Output 2273 ml   Net 2648.26 ml       OBJECTIVE:    /74   Pulse 117   Temp 99.5 °F (37.5 °C) (Bladder)   Resp 20   Ht 5' 11\" (1.803 m)   Wt (!) 310 lb 8 oz (140.8 kg)   SpO2 96%   BMI 43.31 kg/m²     Intubated  General= obese  Cv=s1 s2 audible    Dorsal pedis pulses palpable bilateral  Pulmon= no wheeze  ASSESSMENT:    Acute resp failure  Pneumonia cap/bacterial  Chronic resp failure  OMER ca small cell  Obesity morbid  smoker     PLAN:    As per crit care team/nephrology team/infect disease    DISPOSITION: tbd    Medications:  REVIEWED DAILY    Infusion Medications    dextrose 60 mL/hr at 20 1256    dextrose      fentaNYL 5 mcg/ml in 0.9%  ml infusion 125 mcg/hr (20 1717)    midazolam 7 mg/hr (20 1726)     Scheduled Medications    miconazole nitrate   Topical BID    acetaZOLAMIDE  250 mg Oral BID    bumetanide  2 mg Intravenous Daily    sodium chloride  20 mL Intravenous Once    cefepime  2 g Intravenous Q8H    sodium chloride   Intravenous Q8H    lidocaine PF  5 mL Intradermal Once    heparin flush  3 mL Intravenous 2 times per day    sodium chloride flush  10 mL Intravenous 2 times per day    [Held by provider] insulin glargine  40 Units Subcutaneous Nightly    insulin lispro  0-18 Units Subcutaneous Q4H    famotidine (PEPCID) injection  20 mg Intravenous BID    ipratropium-albuterol  1 ampule Inhalation Q4H WA    sodium chloride (Inhalant)  2 mL Nebulization BID    Arformoterol Tartrate  15 mcg Nebulization BID    [Held by provider] atorvastatin  80 mg Oral Nightly    chlorhexidine  15 mL Mouth/Throat BID    [Held by provider] clopidogrel  75 mg Oral Daily    heparin flush  1 mL Intravenous 2 times per day    methylPREDNISolone  40 mg Intravenous Daily    nicotine  1 patch Transdermal Daily     PRN Meds: miconazole nitrate **AND** miconazole nitrate, sodium chloride flush, heparin flush, sodium chloride flush, acetaminophen **OR** acetaminophen, mineral oil-hydrophilic petrolatum, dextrose, dextrose, glucagon (rDNA), glucose, heparin flush, oxyCODONE-acetaminophen **AND** oxyCODONE, tiZANidine    Labs:     Recent Labs     12/10/20  2210 12/11/20  0700 12/11/20  1514   WBC 5.4 6.2 6.3   HGB 6.8* 7.9* 8.6*   HCT 22.3* 25.0* 28.0*   PLT 26* 20* 28*       Recent Labs     12/09/20  0400  12/10/20  0459 12/10/20  1550 12/11/20  0700 12/11/20  0800 12/11/20  1514      < > 140 139  --  135 138   K 3.6   < > 3.2* 3.1*  --  3.3* 3.6   CL 99   < > 99 97*  --  96* 99   CO2 31*   < > 32* 28  --  27 27   BUN 57*   < > 60* 59*  --  59* 61*   CREATININE 1.1   < > 1.2 1.2  --  1.2 1.3*   CALCIUM 8.8   < > 8.6 8.9  --  8.2* 8.6   PHOS 3.0  --  3.4  --  3.2  --   --     < > = values in this interval not displayed. Recent Labs     12/10/20  1550 12/11/20  0800 12/11/20  1514   PROT 5.0* 4.7* 5.0*   ALKPHOS 163* 160* 179*   ALT 73* 61* 63*   AST 55* 46* 51*   BILITOT 0.6 1.0 0.8       Recent Labs     12/09/20  0400 12/10/20  0459 12/11/20  0700   INR 1.2 1.1 1.1       No results for input(s): Meir Daley in the last 72 hours.     Chronic labs:    Lab Results   Component Value Date    CHOL 115 10/06/2020    TRIG 160 (H) 10/06/2020    HDL 42 10/06/2020    LDLCALC 41 10/06/2020    TSH 1.090 02/15/2018    INR 1.1 12/11/2020    LABA1C 7.7 (H) 11/18/2020       Radiology: REVIEWED DAILY    +++++++++++++++++++++++++++++++++++++++++++++++++  RHEA [id number] [address]  +++++++++++++++++++++++++++++++++++++++++++++++++  NOTE: This report was transcribed using voice recognition software. Every effort was made to ensure accuracy; however, inadvertent computerized transcription errors may be present. Yes

## 2021-03-09 ENCOUNTER — APPOINTMENT (OUTPATIENT)
Dept: ELECTROPHYSIOLOGY | Facility: CLINIC | Age: 81
End: 2021-03-09
Payer: MEDICARE

## 2021-03-09 VITALS
BODY MASS INDEX: 22.74 KG/M2 | OXYGEN SATURATION: 97 % | DIASTOLIC BLOOD PRESSURE: 78 MMHG | HEART RATE: 65 BPM | SYSTOLIC BLOOD PRESSURE: 146 MMHG | WEIGHT: 154 LBS

## 2021-03-09 PROCEDURE — 99072 ADDL SUPL MATRL&STAF TM PHE: CPT

## 2021-03-09 PROCEDURE — 93000 ELECTROCARDIOGRAM COMPLETE: CPT

## 2021-03-09 PROCEDURE — 99214 OFFICE O/P EST MOD 30 MIN: CPT

## 2021-03-09 RX ORDER — WARFARIN SODIUM 2.5 MG/1
2.5 TABLET ORAL
Refills: 0 | Status: DISCONTINUED | COMMUNITY
End: 2021-03-09

## 2021-03-11 NOTE — HISTORY OF PRESENT ILLNESS
[FreeTextEntry1] : Mr. Andrade is a 80 year old male presenting today for follow up for afib/flutter. PMH of AF/AFlutter (on coumadin, s/p ablation 2012), T2DM, COPD, CKD, BPH, early Parkinson's (gait abnormalities, fully alert and oriented, full capacity, some STM loss), C. diff (2015), colon cancer (s/p colectomy) and osteoporosis. \par \par Overall he is doing well. Denies any c/o chest pain, shortness of breath. palpitations, lightheadedness or dizziness. He is fairly active and has been able to tolerate his activities without any limitations. He is anticipating eye surgery (corneal repair and cataract). \par \par

## 2021-03-11 NOTE — END OF VISIT
[FreeTextEntry3] : seen and examined with NP. I agree with H and P, A and P.  monitor to assess burden of arrhythmia.  Also favor echo and stress test given change in exercise tolerance.

## 2021-03-11 NOTE — DISCUSSION/SUMMARY
[FreeTextEntry1] : 80 year old male doing well on metoprolol for paroxysmal atrial arrhythmias. Continue AC (on Coumadin- dosed by Dr. Esparza). I suggested one week event monitor to assess the atrial arrhythmia burden. Will get an echo and stress post eye surgery.  Follow up in 6 months.

## 2021-04-15 ENCOUNTER — APPOINTMENT (OUTPATIENT)
Dept: ELECTROPHYSIOLOGY | Facility: CLINIC | Age: 81
End: 2021-04-15
Payer: MEDICARE

## 2021-04-20 ENCOUNTER — FORM ENCOUNTER (OUTPATIENT)
Age: 81
End: 2021-04-20

## 2021-04-30 DIAGNOSIS — Z01.818 ENCOUNTER FOR OTHER PREPROCEDURAL EXAMINATION: ICD-10-CM

## 2021-05-01 ENCOUNTER — APPOINTMENT (OUTPATIENT)
Dept: DISASTER EMERGENCY | Facility: CLINIC | Age: 81
End: 2021-05-01

## 2021-05-02 LAB — SARS-COV-2 N GENE NPH QL NAA+PROBE: NOT DETECTED

## 2021-05-04 ENCOUNTER — APPOINTMENT (OUTPATIENT)
Dept: CV DIAGNOSITCS | Facility: HOSPITAL | Age: 81
End: 2021-05-04

## 2021-05-04 ENCOUNTER — APPOINTMENT (OUTPATIENT)
Dept: CV DIAGNOSTICS | Facility: HOSPITAL | Age: 81
End: 2021-05-04

## 2021-05-04 ENCOUNTER — OUTPATIENT (OUTPATIENT)
Dept: OUTPATIENT SERVICES | Facility: HOSPITAL | Age: 81
LOS: 1 days | End: 2021-05-04
Payer: COMMERCIAL

## 2021-05-04 DIAGNOSIS — Z98.89 OTHER SPECIFIED POSTPROCEDURAL STATES: Chronic | ICD-10-CM

## 2021-05-04 DIAGNOSIS — Z90.49 ACQUIRED ABSENCE OF OTHER SPECIFIED PARTS OF DIGESTIVE TRACT: Chronic | ICD-10-CM

## 2021-05-04 DIAGNOSIS — I48.91 UNSPECIFIED ATRIAL FIBRILLATION: ICD-10-CM

## 2021-05-04 PROCEDURE — C8929: CPT

## 2021-05-04 PROCEDURE — 93306 TTE W/DOPPLER COMPLETE: CPT | Mod: 26

## 2021-05-07 ENCOUNTER — APPOINTMENT (OUTPATIENT)
Dept: NEUROLOGY | Facility: CLINIC | Age: 81
End: 2021-05-07
Payer: MEDICARE

## 2021-05-07 VITALS
HEIGHT: 69 IN | BODY MASS INDEX: 22.81 KG/M2 | HEART RATE: 56 BPM | SYSTOLIC BLOOD PRESSURE: 116 MMHG | WEIGHT: 154 LBS | DIASTOLIC BLOOD PRESSURE: 69 MMHG

## 2021-05-07 VITALS — TEMPERATURE: 95.3 F

## 2021-05-07 PROCEDURE — 99213 OFFICE O/P EST LOW 20 MIN: CPT

## 2021-05-07 PROCEDURE — 99072 ADDL SUPL MATRL&STAF TM PHE: CPT

## 2021-05-08 NOTE — HISTORY OF PRESENT ILLNESS
[FreeTextEntry1] : 80 yr-old left-handed man noted tremor of left hand 3 years ago. It has not significantly progressed.

## 2021-05-13 PROCEDURE — 93244 EXT ECG>48HR<7D REV&INTERPJ: CPT

## 2021-05-25 ENCOUNTER — NON-APPOINTMENT (OUTPATIENT)
Age: 81
End: 2021-05-25

## 2021-06-28 ENCOUNTER — APPOINTMENT (OUTPATIENT)
Dept: UROLOGY | Facility: CLINIC | Age: 81
End: 2021-06-28
Payer: MEDICARE

## 2021-06-28 VITALS
WEIGHT: 155 LBS | HEIGHT: 69 IN | SYSTOLIC BLOOD PRESSURE: 132 MMHG | DIASTOLIC BLOOD PRESSURE: 70 MMHG | BODY MASS INDEX: 22.96 KG/M2 | TEMPERATURE: 97.7 F

## 2021-06-28 PROCEDURE — 99213 OFFICE O/P EST LOW 20 MIN: CPT

## 2021-06-28 NOTE — ASSESSMENT
[FreeTextEntry1] : Medications were refilled for him.  Examination is relatively unchanged.  He will undergo follow-up renal ultrasound and call me for the results.  He is already on tamsulosin and finasteride.  In order to improve urgency, he could try over-the-counter Azo bladder control.  Follow-up in 1 year.\par \par Kwame Brady MD, FACS\par [facility]\par  of Urology\par \par [address], [address]\[place] [address]\par \par [address], [address]\[place] [address]\par \par Tel: [phone]\par Fax: [phone]

## 2021-06-28 NOTE — HISTORY OF PRESENT ILLNESS
[FreeTextEntry1] : He is a 80-year-old man who seen today in follow-up for elevated PSA and complex renal cyst.  Urinary symptoms are unchanged.  He has urge incontinence small-volume once or twice a day.  He is mildly bothered by it.  Residual urine today was again minimal.  He is on tamsulosin 0.8 mg and finasteride.  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  Follow-up was recommended.\par MRI of the abdomen in August 2019 without contrast showed bilateral simple and hemorrhagic renal cysts, 10.5 cm in the left kidney and 1.5 cm in the right kidney. PSA level was 1.9 in July 2019.\par \par Previous notes: Ultrasound in May 2019 showed a left renal 7 cm cyst and right 1.6 cm complex renal cyst. He has chronic renal insufficiency with creatinine level around 2.5. CT scan of abdomen and pelvis without contrast in April 2018 showed a 10 cm left renal cyst unchanged from before.\par \par Renal ultrasound in January 2016 showed complex cyst in the left kidney 7.9 cm and other bilateral renal cysts including 2 complex cysts in the left kidney up to 1.8 cm. Non-contrast CT scan in May 2016 showed punctate bilateral renal stones, renal cysts of varying sizes largest on the left side.\par He underwent cystoscopy in 11/2013 which showed a large prostate and bleeding from the surface of the prostate. He had 2 prostate biopsies in the past. Previous CT scans including a CT scan with contrast in 2010 indicated that he had a large parapelvic renal cyst.

## 2021-06-28 NOTE — PHYSICAL EXAM
[General Appearance - Well Developed] : well developed [General Appearance - Well Nourished] : well nourished [Normal Appearance] : normal appearance [Well Groomed] : well groomed [General Appearance - In No Acute Distress] : no acute distress [Abdomen Soft] : soft [Abdomen Tenderness] : non-tender [Costovertebral Angle Tenderness] : no ~M costovertebral angle tenderness [Penis Abnormality] : normal circumcised penis [Urethral Meatus] : meatus normal [Urinary Bladder Findings] : the bladder was normal on palpation [Scrotum] : the scrotum was normal [Testes Tenderness] : no tenderness of the testes [Epididymis] : the epididymides were normal [Testes Mass (___cm)] : there were no testicular masses [Prostate Tenderness] : the prostate was not tender [No Prostate Nodules] : no prostate nodules [Prostate Enlarged] : was enlarged [FreeTextEntry1] : ELVER done on 6/2021 [] : no respiratory distress [Respiration, Rhythm And Depth] : normal respiratory rhythm and effort [Exaggerated Use Of Accessory Muscles For Inspiration] : no accessory muscle use [Oriented To Time, Place, And Person] : oriented to person, place, and time [Affect] : the affect was normal [Mood] : the mood was normal [Not Anxious] : not anxious

## 2021-06-28 NOTE — LETTER BODY
[Dear  ___] : Dear  [unfilled], [Attached please find my note.] : Attached please find my note. [Thank you very much for allowing me to participate in the care of this patient. If you have any questions, please do not hesitate to contact me] : Thank you very much for allowing me to participate in the care of this patient. If you have any questions, please do not hesitate to contact me. [FreeTextEntry1] : ACP\par [address] \[place], [place], [address]  \par [phone] \par

## 2021-08-04 ENCOUNTER — NON-APPOINTMENT (OUTPATIENT)
Age: 81
End: 2021-08-04

## 2021-11-09 ENCOUNTER — APPOINTMENT (OUTPATIENT)
Dept: NEUROLOGY | Facility: CLINIC | Age: 81
End: 2021-11-09
Payer: MEDICARE

## 2021-11-09 VITALS
SYSTOLIC BLOOD PRESSURE: 128 MMHG | DIASTOLIC BLOOD PRESSURE: 68 MMHG | BODY MASS INDEX: 22.81 KG/M2 | HEIGHT: 69 IN | WEIGHT: 154 LBS | HEART RATE: 54 BPM

## 2021-11-09 DIAGNOSIS — G20 PARKINSON'S DISEASE: ICD-10-CM

## 2021-11-09 PROCEDURE — 99212 OFFICE O/P EST SF 10 MIN: CPT

## 2021-11-09 NOTE — ASSESSMENT
[FreeTextEntry1] : Mild left hemiparkinsonism unchanged from 6 months ago.  Encourage exercise.\par \par Return for follow-up in 6 months.

## 2021-11-09 NOTE — PHYSICAL EXAM
[FreeTextEntry1] : Alert and oriented. No aphasia. No cognitive deficits. Cranial nerves intact. No hypomimia. He walks with diminished left arm swing and has mild cogwheel rigidity. Resting left hand intermittent tremor. Postural reflexes intact. No other focal neurologic deficits.

## 2021-11-09 NOTE — HISTORY OF PRESENT ILLNESS
[FreeTextEntry1] : 80 yr-old left-handed man noted tremor of left hand 4 years ago. It has not significantly progressed. No benefit from trial of amantadine.

## 2022-01-04 ENCOUNTER — NON-APPOINTMENT (OUTPATIENT)
Age: 82
End: 2022-01-04

## 2022-01-04 ENCOUNTER — APPOINTMENT (OUTPATIENT)
Dept: ELECTROPHYSIOLOGY | Facility: CLINIC | Age: 82
End: 2022-01-04
Payer: MEDICARE

## 2022-01-04 VITALS
HEIGHT: 69 IN | HEART RATE: 66 BPM | WEIGHT: 156 LBS | OXYGEN SATURATION: 98 % | BODY MASS INDEX: 23.11 KG/M2 | DIASTOLIC BLOOD PRESSURE: 78 MMHG | SYSTOLIC BLOOD PRESSURE: 129 MMHG

## 2022-01-04 DIAGNOSIS — I48.91 UNSPECIFIED ATRIAL FIBRILLATION: ICD-10-CM

## 2022-01-04 PROCEDURE — 93000 ELECTROCARDIOGRAM COMPLETE: CPT

## 2022-01-04 PROCEDURE — 99213 OFFICE O/P EST LOW 20 MIN: CPT

## 2022-01-04 RX ORDER — PANTOPRAZOLE 40 MG/1
40 TABLET, DELAYED RELEASE ORAL
Refills: 3 | Status: DISCONTINUED | COMMUNITY
Start: 2019-12-26 | End: 2022-01-04

## 2022-01-04 RX ORDER — THEOPHYLLINE 300 MG/1
300 TABLET, EXTENDED RELEASE ORAL TWICE DAILY
Refills: 0 | Status: ACTIVE | COMMUNITY
Start: 2022-01-04

## 2022-01-04 RX ORDER — FLUTICASONE FUROATE 100 UG/1
100 POWDER RESPIRATORY (INHALATION) DAILY
Refills: 0 | Status: DISCONTINUED | COMMUNITY
End: 2022-01-04

## 2022-01-04 RX ORDER — INSULIN GLARGINE 100 [IU]/ML
100 INJECTION, SOLUTION SUBCUTANEOUS
Refills: 0 | Status: ACTIVE | COMMUNITY
Start: 2022-01-04

## 2022-01-04 RX ORDER — INSULIN ASPART 100 [IU]/ML
100 INJECTION, SOLUTION INTRAVENOUS; SUBCUTANEOUS
Refills: 0 | Status: ACTIVE | COMMUNITY
Start: 2022-01-04

## 2022-01-04 NOTE — HISTORY OF PRESENT ILLNESS
[FreeTextEntry1] : No chest pain. No shortness of breath. No palpitations. No lightheadedness/dizziness. He does not exercise that much, but believes that he is most limited by his Parkinson's.  When he goes to [employer] he holds on the wagon for security.  He does not believe that he needs a walker or a cane.

## 2022-01-04 NOTE — DISCUSSION/SUMMARY
[FreeTextEntry1] : 81 year old male doing well on metoprolol for paroxysmal atrial arrhythmias. Continue AC (on Coumadin- dosed by Dr. Esparza).   Echo with no change in substrate.  He is awaiting follow up C-scope (history of colon cancer). He may hold his Coumadin for this procedure. It should be resumed as soon as deemed appropriate by performing MD.

## 2022-01-04 NOTE — CARDIOLOGY SUMMARY
[___] : [unfilled] [de-identified] : today: sinus maria eugenia with sinus arrhythmia [de-identified] : Ivette XT 4/21 - 428/2021)\par SVE at 4.8%\par salvos of PAT  [de-identified] : TTE 5/4/2021\par MAC with minimal MR\par Trace AR\par Asymetric septal hypertrophy.  Apical hypertrophy best seen with Definity\par Normal LV systolic function. Paradoxical motion of the septum may be due to intrerventricular conduction delay\par Normal diastolic function\par Normal RV size and function.

## 2022-01-26 ENCOUNTER — TRANSCRIPTION ENCOUNTER (OUTPATIENT)
Age: 82
End: 2022-01-26

## 2022-04-29 NOTE — ED ADULT NURSE NOTE - NSFALLRSKPSTHSTOCCUR_ED_ALL_ED
Patient:   Miguel A Mcneill            MRN: [id number]            FIN: [id number]               Age:   44 years     Sex:  Male     :  1976   Associated Diagnoses:   None   Author:   River Dixon MD      Preoperative Diagnosis: Cataract Left eye    Postoperative Diagnosis: Cataract Left eye    Procedure: Phacoemulsification with intaocular lens implantations Left eye    Surgeon: River Dixon MD    Assistant: Sara Coats, [facility]    Anestheisa: MAC    Complications: None    The patient was brought into the operating suite, where the patient was correctly identified as was the operative eye via timeout.  The patient was prepped and draped in a sterile ophthalmic fashion.  A lid speculum was placed in the operative eye and the microscope was brought into place.  A 1.0mm paracentesis was then made at (6) o'clock.  The anterior chamber was filled with Endocoat.  A (temporal) clear corneal incision was made with a 2.4 mm keratome.  A 6 mm corneal marking ring was used to funmilayo the cornea centered over the visual axis.  A 5.00 mm continuous curvilinear capsulorhexis was fashioned using a cystotome and microcapsular forceps.  Hydrodissection and hydrodelineation was performed with upreserved 1% Xylocaine.  The nucleus was then phacoemulsified 1with the Abbott phacoemulsification hand-piece with a total of (1) EFX.  The cortex was then removed with the I/A hand-piece. The lens model (ZCB00) with a power of (24.0) was placed in the capsular bag.  The Helon was then removed from the eye with the I/A hand piece.  The anterior chamber was inflated and the wounds were hydrated with BSS.  The wounds were checked with Weck-Patti sponges and found to be watertight.  The lid speculum was removed and topical antibiotics were placed on the operative eye.  The patient was brought to PACU in good condition.   
Single Mechanical/Accidental Fall

## 2022-06-14 RX ORDER — METOPROLOL SUCCINATE 200 MG/1
200 TABLET, EXTENDED RELEASE ORAL
Qty: 90 | Refills: 1 | Status: ACTIVE | COMMUNITY
Start: 1900-01-01 | End: 1900-01-01

## 2022-06-24 ENCOUNTER — APPOINTMENT (OUTPATIENT)
Dept: NEUROLOGY | Facility: CLINIC | Age: 82
End: 2022-06-24
Payer: MEDICARE

## 2022-06-24 VITALS
HEIGHT: 69 IN | BODY MASS INDEX: 22.96 KG/M2 | WEIGHT: 155 LBS | HEART RATE: 62 BPM | DIASTOLIC BLOOD PRESSURE: 71 MMHG | SYSTOLIC BLOOD PRESSURE: 123 MMHG

## 2022-06-24 PROCEDURE — 99213 OFFICE O/P EST LOW 20 MIN: CPT

## 2022-06-25 NOTE — ASSESSMENT
[FreeTextEntry1] : Mild left hemiparkinsonism unchanged from 7 months ago.  Encourage exercise.\par \par Return for follow-up in 6 months.

## 2022-06-25 NOTE — PHYSICAL EXAM
[FreeTextEntry1] : Alert and oriented. No aphasia. No cognitive deficits. Cranial nerves intact. No hypomimia. He walks with diminished left arm swing and has mild cogwheel rigidity of left upper limb. Resting left hand intermittent tremor. Postural reflexes intact. No other focal neurologic deficits.

## 2022-06-25 NOTE — HISTORY OF PRESENT ILLNESS
[FreeTextEntry1] : 81 yr-old left-handed man noted tremor of left hand 5 years ago. It has not significantly progressed. No benefit from trial of amantadine.

## 2022-07-07 ENCOUNTER — APPOINTMENT (OUTPATIENT)
Dept: UROLOGY | Facility: CLINIC | Age: 82
End: 2022-07-07

## 2022-07-15 NOTE — BEGINNING OF VISIT
[facility]  Follow Up Note     Patient: Jorge Whalen Date of Service: 7/15/2022   : 1964    58 year old male      Chief Complaint   Patient presents with   • Follow-up       HISTORY OF PRESENT ILLNESS     This is a 58 year old male who presents today for the following:    Wt Readings from Last 5 Encounters:   07/15/22 (!) 206.8 kg (456 lb)   21 (!) 215.7 kg (475 lb 9.6 oz)   21 (!) 212.6 kg (468 lb 11.2 oz)   21 (!) 210 kg (463 lb)   21 (!) 210.9 kg (465 lb)     Wants ears checked    Past medical history, surgical history, medications, allergies, family history and social history reviewed and updated in [employer].    REVIEW OF SYSTEMS       All other systems are reviewed and are negative except as documented in the history of present illness.    PHYSICAL EXAM     Vital Signs:   height is 6' (1.829 m) and weight is 206.8 kg (456 lb) (abnormal). His blood pressure is 142/83 (abnormal) and his pulse is 76.   132/80 on recheck    General:  No acute distress, well nourished  male   HEENT:  Bilateral impacted cerumen, normal canals and TMs after removal.    PROCEDURE: Removal of Impacted Cerumen    I removed impacted cerumen from the patient's bilateral ear(s) using otoscope, curette and irrigation. Removal took a moderate amount of time and was not difficult at all. Patient tolerated the procedure well. No injury occurred to the canal or tympanic membrane.      LABS     HgbA1c Hemoglobin A1C (%)   Date Value   2021 5.6      LDL LDL (mg/dL)   Date Value   2021 67      Creatinine  Creatinine (mg/dL)   Date Value   2021 0.90      Hemoglobin HGB (g/dL)   Date Value   2021 14.6          ASSESSMENT AND PLAN     This is a 58 year old male who presents with:    1. Essential hypertension, benign  Fair control.    2. Class 3 severe obesity due to excess calories with serious comorbidity and body mass index (BMI) of 60.0 to 69.9 in adult  [Patient] : patient (CMS/HCC)  Significant weight loss, just watching portions and mindful eating.  Encouraged to keep up good work!    3. Bilateral impacted cerumen  Removed with instrumentation.      Return in about 5 months (around 12/15/2022) for Routine physical exam, Hypertension, Weight management.    Patient's medical conditions, proposed management plan, risks, benefits and alternatives were discussed with the patient in detail. The patient understands and is agreeable.    Jeri Segura MD

## 2022-07-18 ENCOUNTER — APPOINTMENT (OUTPATIENT)
Dept: UROLOGY | Facility: CLINIC | Age: 82
End: 2022-07-18

## 2022-08-01 ENCOUNTER — APPOINTMENT (OUTPATIENT)
Dept: UROLOGY | Facility: CLINIC | Age: 82
End: 2022-08-01

## 2022-08-01 VITALS
HEIGHT: 69 IN | DIASTOLIC BLOOD PRESSURE: 46 MMHG | BODY MASS INDEX: 22.96 KG/M2 | SYSTOLIC BLOOD PRESSURE: 102 MMHG | WEIGHT: 155 LBS | HEART RATE: 74 BPM | OXYGEN SATURATION: 97 % | RESPIRATION RATE: 14 BRPM | TEMPERATURE: 98 F

## 2022-08-01 DIAGNOSIS — Z00.00 ENCOUNTER FOR GENERAL ADULT MEDICAL EXAMINATION W/OUT ABNORMAL FINDINGS: ICD-10-CM

## 2022-08-01 DIAGNOSIS — Z87.898 PERSONAL HISTORY OF OTHER SPECIFIED CONDITIONS: ICD-10-CM

## 2022-08-01 PROCEDURE — 99214 OFFICE O/P EST MOD 30 MIN: CPT

## 2022-08-01 RX ORDER — FLASH GLUCOSE SENSOR
KIT MISCELLANEOUS
Qty: 6 | Refills: 0 | Status: ACTIVE | COMMUNITY
Start: 2022-06-14

## 2022-08-01 RX ORDER — PREDNISONE 20 MG/1
20 TABLET ORAL
Qty: 7 | Refills: 0 | Status: ACTIVE | COMMUNITY
Start: 2022-03-08

## 2022-08-01 RX ORDER — AZELASTINE HYDROCHLORIDE 205.5 UG/1
0.15 SPRAY, METERED NASAL
Qty: 30 | Refills: 0 | Status: ACTIVE | COMMUNITY
Start: 2022-06-01

## 2022-08-01 RX ORDER — CALCIUM ACETATE 667 MG/1
667 CAPSULE ORAL
Qty: 90 | Refills: 0 | Status: ACTIVE | COMMUNITY
Start: 2022-05-16

## 2022-08-01 RX ORDER — DENOSUMAB 60 MG/ML
60 INJECTION SUBCUTANEOUS
Qty: 1 | Refills: 0 | Status: ACTIVE | COMMUNITY
Start: 2021-09-22

## 2022-08-01 RX ORDER — DOXYCYCLINE HYCLATE 100 MG/1
100 CAPSULE ORAL
Qty: 20 | Refills: 0 | Status: ACTIVE | COMMUNITY
Start: 2022-02-09

## 2022-08-01 RX ORDER — CICLOPIROX OLAMINE 7.7 MG/G
0.77 CREAM TOPICAL
Qty: 90 | Refills: 0 | Status: ACTIVE | COMMUNITY
Start: 2022-02-07

## 2022-08-01 RX ORDER — PEN NEEDLE, DIABETIC 31 G X1/4"
31G X 5 MM NEEDLE, DISPOSABLE MISCELLANEOUS
Qty: 100 | Refills: 0 | Status: ACTIVE | COMMUNITY
Start: 2022-06-15

## 2022-08-01 RX ORDER — THEOPHYLLINE 400 MG/1
400 TABLET, EXTENDED RELEASE ORAL
Qty: 30 | Refills: 0 | Status: ACTIVE | COMMUNITY
Start: 2022-07-06

## 2022-08-01 NOTE — PHYSICAL EXAM
[Urethral Meatus] : meatus normal [Penis Abnormality] : normal circumcised penis [Urinary Bladder Findings] : the bladder was normal on palpation [Scrotum] : the scrotum was normal [Epididymis] : the epididymides were normal [Testes Tenderness] : no tenderness of the testes [Testes Mass (___cm)] : there were no testicular masses [Prostate Tenderness] : the prostate was not tender [No Prostate Nodules] : no prostate nodules [Prostate Enlarged] : was enlarged [General Appearance - Well Developed] : well developed [General Appearance - Well Nourished] : well nourished [Normal Appearance] : normal appearance [Well Groomed] : well groomed [General Appearance - In No Acute Distress] : no acute distress [Abdomen Soft] : soft [Abdomen Tenderness] : non-tender [Costovertebral Angle Tenderness] : no ~M costovertebral angle tenderness [FreeTextEntry1] : ELVER done in 2021.  [] : no respiratory distress [Respiration, Rhythm And Depth] : normal respiratory rhythm and effort [Exaggerated Use Of Accessory Muscles For Inspiration] : no accessory muscle use [Oriented To Time, Place, And Person] : oriented to person, place, and time [Affect] : the affect was normal [Mood] : the mood was normal [Not Anxious] : not anxious

## 2022-08-01 NOTE — LETTER BODY
[Dear  ___] : Dear  [unfilled], [Attached please find my note.] : Attached please find my note. [Thank you very much for allowing me to participate in the care of this patient. If you have any questions, please do not hesitate to contact me] : Thank you very much for allowing me to participate in the care of this patient. If you have any questions, please do not hesitate to contact me. [FreeTextEntry1] : [facility]\par Address: [address], \[place] [address]\par [phone]

## 2022-08-01 NOTE — ASSESSMENT
[FreeTextEntry1] : His examination is unchanged.  Residual urine volume remains minimal.  Urge incontinence seems to have actually improved.  He will continue with finasteride and tamsulosin which was refilled for him previously.  Renal ultrasound appears to be unchanged with complex renal cyst.  He will repeat renal ultrasound.  He can contact me for the results.  He has previous history of elevated PSA level which he has decided on no further intervention given his advanced age.  He can follow-up in 1 year pending results.\par \par Kwame Brady MD, FACS\par [facility]\par  of Urology\par \par [address], [address]\[place] [address]\par \par [address], [address]\[place] [address]\par \par Tel: [phone]\par Fax: [phone]

## 2022-08-01 NOTE — HISTORY OF PRESENT ILLNESS
[FreeTextEntry1] : He is a 81-year-old man who seen today in follow-up for elevated PSA level, urinary symptoms and complex renal cyst.  It appears that urge incontinence is occurring less frequently than before and now it is 2-3 times a week.  He continues on finasteride and also tamsulosin 0.8 mg.  Residual urine volume today was minimal.  Ultrasound in August 2021 showed bilateral renal cysts with a left complex cyst about 7.2 cm which was not significantly changed from before.  He has no flank pain.\par \par Renal ultrasound in July 2020 showed small right renal cysts and a left 10 cm complex renal cyst possibly Bosniak 2F.  MRI of the abdomen in August 2019 without contrast showed bilateral simple and hemorrhagic renal cysts, 10.5 cm in the left kidney and 1.5 cm in the right kidney. PSA level was 1.9 in July 2019.\par \par Previous notes:  He has chronic renal insufficiency with creatinine level around 2.5. CT scan of abdomen and pelvis without contrast in April 2018 showed a 10 cm left renal cyst unchanged from before.\par \par Non-contrast CT scan in May 2016 showed punctate bilateral renal stones, renal cysts of varying sizes largest on the left side. He underwent cystoscopy in 2013 which showed a large prostate and bleeding from the surface of the prostate. He had 2 prostate biopsies in the past. Previous CT scans including a CT scan with contrast in 2010 indicated that he had a large parapelvic renal cyst.

## 2022-08-08 ENCOUNTER — NON-APPOINTMENT (OUTPATIENT)
Age: 82
End: 2022-08-08

## 2022-10-30 LAB
APPEARANCE: ABNORMAL
BACTERIA: NEGATIVE
BILIRUBIN URINE: NEGATIVE
BLOOD URINE: ABNORMAL
COLOR: YELLOW
GLUCOSE QUALITATIVE U: ABNORMAL
HYALINE CASTS: 1 /LPF
KETONES URINE: NEGATIVE
LEUKOCYTE ESTERASE URINE: ABNORMAL
MICROSCOPIC-UA: NORMAL
NITRITE URINE: NEGATIVE
PH URINE: 6.5
PROTEIN URINE: ABNORMAL
RED BLOOD CELLS URINE: 36 /HPF
SPECIFIC GRAVITY URINE: 1.01
SQUAMOUS EPITHELIAL CELLS: 0 /HPF
UROBILINOGEN URINE: NORMAL
WHITE BLOOD CELLS URINE: 197 /HPF

## 2022-10-31 LAB — BACTERIA UR CULT: ABNORMAL

## 2023-01-20 ENCOUNTER — APPOINTMENT (OUTPATIENT)
Dept: UROLOGY | Facility: CLINIC | Age: 83
End: 2023-01-20
Payer: MEDICARE

## 2023-01-20 DIAGNOSIS — R35.0 FREQUENCY OF MICTURITION: ICD-10-CM

## 2023-01-20 DIAGNOSIS — N39.0 URINARY TRACT INFECTION, SITE NOT SPECIFIED: ICD-10-CM

## 2023-01-20 PROCEDURE — 99214 OFFICE O/P EST MOD 30 MIN: CPT

## 2023-01-20 NOTE — LETTER BODY
[Dear  ___] : Dear  [unfilled], [Attached please find my note.] : Attached please find my note. [Thank you very much for allowing me to participate in the care of this patient. If you have any questions, please do not hesitate to contact me] : Thank you very much for allowing me to participate in the care of this patient. If you have any questions, please do not hesitate to contact me. [FreeTextEntry1] : [facility]\par Address: [address], \[place] [address]\par [phone]

## 2023-01-20 NOTE — ASSESSMENT
[FreeTextEntry1] : Dysuria has subsided.  Repeat urine culture was negative.  Patient states that he was told to have sepsis.  Residual urine volume remains low.  He is already on finasteride and tamsulosin 2 capsules.  He could try Azo bladder control over-the-counter for episodes of urgency and urge incontinence.  I would rather not add any further medications to his list of medications.  Based on CT from the hospital is cystic left renal complex lesion seems to be relatively unchanged.  He may continue to follow complex renal cyst with imaging on annual basis.\par \par Kwame Brady MD, FACS\par [facility]\par  of Urology\par \par [address], [address]\[place] [address]\par \par [address], [address]\[place] [address]\par \par Tel: [phone]\par Fax: [phone]

## 2023-01-20 NOTE — PHYSICAL EXAM
[Urethral Meatus] : meatus normal [Penis Abnormality] : normal circumcised penis [Urinary Bladder Findings] : the bladder was normal on palpation [Scrotum] : the scrotum was normal [Epididymis] : the epididymides were normal [Testes Tenderness] : no tenderness of the testes [Testes Mass (___cm)] : there were no testicular masses [Prostate Tenderness] : the prostate was not tender [No Prostate Nodules] : no prostate nodules [Prostate Enlarged] : was enlarged [General Appearance - Well Developed] : well developed [General Appearance - Well Nourished] : well nourished [Normal Appearance] : normal appearance [Well Groomed] : well groomed [General Appearance - In No Acute Distress] : no acute distress [Abdomen Soft] : soft [Abdomen Tenderness] : non-tender [Costovertebral Angle Tenderness] : no ~M costovertebral angle tenderness [] : no respiratory distress [Respiration, Rhythm And Depth] : normal respiratory rhythm and effort [Exaggerated Use Of Accessory Muscles For Inspiration] : no accessory muscle use [Oriented To Time, Place, And Person] : oriented to person, place, and time [Affect] : the affect was normal [Mood] : the mood was normal [Not Anxious] : not anxious [FreeTextEntry1] : Prostate examination was done previously.

## 2023-01-20 NOTE — HISTORY OF PRESENT ILLNESS
[FreeTextEntry1] : He is an 82 year-old man who seen today in follow-up for elevated PSA level, urinary symptoms and complex renal cyst.  In October 2022 he was diagnosed with E. coli UTI.  Then at home he fell and he was taken to [facility].  He was told to have sepsis and was in hospital for 1 week and then rehab.  Dysuria has subsided.  He does not recall if he had any fever.  He continues to have urge incontinence every now and then.  Nocturia is 2-3 times.  He is on tamsulosin 0.8 mg and finasteride.  Residual urine volume today was about 80 mL.  Repeat urine culture in January 2023 was negative.  CT in November 2022 from the hospital showed 7.9 cm complex cystic left renal lesion.\par \par Ultrasound in August 2021 showed bilateral renal cysts with a left complex cyst about 7.2 cm which was not significantly changed from before.  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  MRI of the abdomen in August 2019 without contrast showed bilateral simple and hemorrhagic renal cysts, 10.5 cm in the left kidney and 1.5 cm in the right kidney. PSA level was 1.9 in July 2019.\par \par He has chronic renal insufficiency with creatinine level around 2.5. CT scan of abdomen and pelvis without contrast in April 2018 showed a 10 cm left renal cyst unchanged from before.\par \par Non-contrast CT scan in May 2016 showed punctate bilateral renal stones, renal cysts of varying sizes largest on the left side. He underwent cystoscopy in 2013 which showed a large prostate and bleeding from the surface of the prostate. He had 2 prostate biopsies in the past. Previous CT scans including a CT scan with contrast in 2010 indicated that he had a large parapelvic renal cyst.

## 2023-03-15 NOTE — PATIENT PROFILE ADULT - NSSCCAGEALCOHOLGUILTYABOUT_GEN_A_NUR
Oral Chemotherapy     Argentina Gallegos is a 75 y.o.male seen for consultation for pharmacist oral chemotherapy management. Diagnosis: neuroendocrine carcinoma    Medication name: enzalutamide (Xtandi) 40 mg tab  Dose: four (4) tablets of 40 mg for a total dose of 160 mg by mouth   Frequency: daily  Ordering provider: Dr. Emir Michele  Next cycle start date: 3/6/23 per patient    Called Mr Chriss Shin to check in. He saw Dr Matheus Doherty for memory issues last week and was prescribed memantine. Per neurologist note, he will be avoiding donepezil due to interactions with other medications. Interactions checked; donepezil could increase Qtc when combined with prochlorperazine, although patient reports not taking this because he does not experience nausea. Therefore, donepezil is a viable option in the future if needed. Mr Chriss Shin however did not remember meeting with the neurologist and did not remember being prescribed memantine. Reviewed dosing, instructions and pharmacy information so patient could go today to collect the prescription. Patient reports no adherence issues with the [facility], although per previous encounter, he does have gaps in number of tablets available and his cycle start dates. We again discussed taking Xtandi at nighttime since patient feels sleepy after taking it in the morning. Home health still comes to help with medications, however alexandra Dick has not been coming due to coming down with Covid-19. Will recruit him to help with medication adherence. Overall, Mr Chriss Shin seems to need more help adhering to his prescribed medications. We reviewed the above information with concise instructions to 1) Visit [facility] pharmacy on [facility] to collect new memory medication and 2) Take Xtandi at bedtime instead of in the morning. Mr Chriss Shin wrote these instructions down. After this discussion, Mr Chriss Shin expressed understanding of all topics covered and suggestions given. Will continue to follow frequently.     Thank you,    Cindy Cedar, PharmD, BCPS    For Pharmacy Admin Tracking Only    Program: Medication Management  CPA in place: Yes  Recommendation Provided To: Patient/Caregiver: 1 via Telephone  Intervention Detail: Adherence Monitorin  Intervention Accepted By: Patient/Caregiver: 1  Time Spent (min): 60 no

## 2023-03-20 ENCOUNTER — APPOINTMENT (OUTPATIENT)
Dept: ORTHOPEDIC SURGERY | Facility: CLINIC | Age: 83
End: 2023-03-20

## 2023-04-16 ENCOUNTER — RX RENEWAL (OUTPATIENT)
Age: 83
End: 2023-04-16

## 2023-04-20 ENCOUNTER — APPOINTMENT (OUTPATIENT)
Dept: PAIN MANAGEMENT | Facility: CLINIC | Age: 83
End: 2023-04-20
Payer: MEDICARE

## 2023-04-20 VITALS — HEIGHT: 69 IN | BODY MASS INDEX: 22.22 KG/M2 | WEIGHT: 150 LBS

## 2023-04-20 DIAGNOSIS — M79.18 MYALGIA, OTHER SITE: ICD-10-CM

## 2023-04-20 DIAGNOSIS — M54.2 CERVICALGIA: ICD-10-CM

## 2023-04-20 PROCEDURE — 99204 OFFICE O/P NEW MOD 45 MIN: CPT | Mod: 25

## 2023-04-20 PROCEDURE — 99214 OFFICE O/P EST MOD 30 MIN: CPT | Mod: 25

## 2023-04-20 PROCEDURE — 20552 NJX 1/MLT TRIGGER POINT 1/2: CPT

## 2023-04-20 PROCEDURE — J3490M: CUSTOM

## 2023-04-20 NOTE — PROCEDURE
[Trigger point 1-2 muscle groups] : trigger point 1-2 muscle groups [Bilateral] : bilaterally of the [Trapezius muscle] : trapezius muscle [Pain] : pain [Inflammation] : inflammation [Ethyl Chloride sprayed topically] : ethyl chloride sprayed topically [Alcohol] : alcohol [Sterile technique used] : sterile technique used [___ cc    1%] : Lidocaine ~Vcc of 1%  [___ cc    0.25%] : Bupivacaine (Marcaine) ~Vcc of 0.25%  [___ cc    10mg] : Triamcinolone (Kenalog) ~Vcc of 10 mg  [Call if redness, pain or fever occur] : call if redness, pain or fever occur [Risks, benefits, alternatives discussed / Verbal consent obtained] : the risks benefits, and alternatives have been discussed, and verbal consent was obtained

## 2023-04-20 NOTE — HISTORY OF PRESENT ILLNESS
[Neck] : neck [Lower back] : lower back [0] : 0 [Radiating] : radiating [Intermittent] : intermittent [Meds] : meds [Heat] : heat [Bending forward] : bending forward [FreeTextEntry1] : NEW PATIENT CONSULT BEING SEEN FOR NECK AND LOWER BACK . HE STATES THAT PAIN GOES TO THE SIDES OF THE NECK  [] : no [FreeTextEntry7] : MIDDLE AND SIDE OF NECK

## 2023-04-20 NOTE — PHYSICAL EXAM
[de-identified] : PHYSICAL EXAM\par \par Constitutional: \par Appears well, no apparent distress\par Ability to communicate: Normal\par Respiratory: non-labored breathing\par Skin: no rash noted\par Head: normocephalic, atraumatic\par Neck: no visible thyroid enlargement\par Eyes: extraocular movements intact\par Neurologic: alert and oriented x3\par Psychiatric: normal mood, affect, and behavior\par \par \par Neck: Palpation of the cervical spine is as follows: left trapezial spasm, right trapezial spasm, left trapezial tenderness, right trapezial tenderness, left paracervical spasm, right paracervical spasm, left paracervical tenderness and right paracervical tenderness. Range of motion of the cervical spine is as follows: diminished range of motion in all planes Pain at extremes of rotation to right and rotation to left. Stiffness at extremes of rotation to right and rotation to left. Strength Testing for the cervical spine is as follows: Left Deltoid strength 5/5, Right Deltoid strength 5/5, Left Biceps 5/5, Right Biceps 5/5, Left Triceps 5/5, Right Triceps 5/5, Left Wrist Flexors 5/5, Right Wrist Flexors 5/5, Left Finger Abductors 5/5, Right Finger Abductors 5/5, Left Grasp 5/5 and Right Grasp 5/5 Neurological testing for the cervical spine is as follows: positive bilateral facet loading. light touch is intact throughout both upper extremities, normal deep tendon reflexes bilateral upper extremities, negative Spurling test and negative Boston reflex \par \par \par Assessment\par \par Spondylosis of cervical region (M47.812)\par Cervicalgia (M54.2)\par \par \par

## 2023-05-25 ENCOUNTER — APPOINTMENT (OUTPATIENT)
Dept: PAIN MANAGEMENT | Facility: CLINIC | Age: 83
End: 2023-05-25
Payer: MEDICARE

## 2023-05-25 VITALS — WEIGHT: 150 LBS | BODY MASS INDEX: 22.22 KG/M2 | HEIGHT: 69 IN

## 2023-05-25 PROCEDURE — 99213 OFFICE O/P EST LOW 20 MIN: CPT

## 2023-05-25 RX ORDER — TRAMADOL HYDROCHLORIDE 50 MG/1
50 TABLET, COATED ORAL
Qty: 21 | Refills: 0 | Status: ACTIVE | COMMUNITY
Start: 2023-05-25 | End: 1900-01-01

## 2023-05-25 NOTE — HISTORY OF PRESENT ILLNESS
[Neck] : neck [Lower back] : lower back [2] : 2 [Dull/Aching] : dull/aching [] : yes [Intermittent] : intermittent [Household chores] : household chores [Leisure] : leisure [Meds] : meds [Walking] : walking [Bending forward] : bending forward [FreeTextEntry7] : b/l shoulders

## 2023-05-25 NOTE — PHYSICAL EXAM
[de-identified] : PHYSICAL EXAM\par \par Constitutional: \par Appears well, no apparent distress\par Ability to communicate: Normal\par Respiratory: non-labored breathing\par Skin: no rash noted\par Head: normocephalic, atraumatic\par Neck: no visible thyroid enlargement\par Eyes: extraocular movements intact\par Neurologic: alert and oriented x3\par Psychiatric: normal mood, affect, and behavior\par \par \par Neck: Palpation of the cervical spine is as follows: left trapezial spasm, right trapezial spasm, left trapezial tenderness, right trapezial tenderness, left paracervical spasm, right paracervical spasm, left paracervical tenderness and right paracervical tenderness. Range of motion of the cervical spine is as follows: diminished range of motion in all planes Pain at extremes of rotation to right and rotation to left. Stiffness at extremes of rotation to right and rotation to left. Strength Testing for the cervical spine is as follows: Left Deltoid strength 5/5, Right Deltoid strength 5/5, Left Biceps 5/5, Right Biceps 5/5, Left Triceps 5/5, Right Triceps 5/5, Left Wrist Flexors 5/5, Right Wrist Flexors 5/5, Left Finger Abductors 5/5, Right Finger Abductors 5/5, Left Grasp 5/5 and Right Grasp 5/5 Neurological testing for the cervical spine is as follows: positive bilateral facet loading. light touch is intact throughout both upper extremities, normal deep tendon reflexes bilateral upper extremities, negative Spurling test and negative Boston reflex \par \par \par Assessment\par \par Spondylosis of cervical region (M47.812)\par Cervicalgia (M54.2)\par \par \par

## 2023-06-16 ENCOUNTER — RX RENEWAL (OUTPATIENT)
Age: 83
End: 2023-06-16

## 2023-07-21 ENCOUNTER — APPOINTMENT (OUTPATIENT)
Dept: UROLOGY | Facility: CLINIC | Age: 83
End: 2023-07-21

## 2023-08-31 ENCOUNTER — APPOINTMENT (OUTPATIENT)
Dept: UROLOGY | Facility: CLINIC | Age: 83
End: 2023-08-31

## 2023-09-21 ENCOUNTER — APPOINTMENT (OUTPATIENT)
Dept: UROLOGY | Facility: CLINIC | Age: 83
End: 2023-09-21
Payer: MEDICARE

## 2023-09-21 DIAGNOSIS — N28.1 CYST OF KIDNEY, ACQUIRED: ICD-10-CM

## 2023-09-21 DIAGNOSIS — N40.1 BENIGN PROSTATIC HYPERPLASIA WITH LOWER URINARY TRACT SYMPMS: ICD-10-CM

## 2023-09-21 DIAGNOSIS — N13.8 BENIGN PROSTATIC HYPERPLASIA WITH LOWER URINARY TRACT SYMPMS: ICD-10-CM

## 2023-09-21 PROCEDURE — 99213 OFFICE O/P EST LOW 20 MIN: CPT

## 2024-01-07 ENCOUNTER — RX RENEWAL (OUTPATIENT)
Age: 84
End: 2024-01-07

## 2024-03-11 ENCOUNTER — RX RENEWAL (OUTPATIENT)
Age: 84
End: 2024-03-11
